# Patient Record
Sex: FEMALE | Race: WHITE | NOT HISPANIC OR LATINO | Employment: OTHER | URBAN - METROPOLITAN AREA
[De-identification: names, ages, dates, MRNs, and addresses within clinical notes are randomized per-mention and may not be internally consistent; named-entity substitution may affect disease eponyms.]

---

## 2017-07-06 ENCOUNTER — OFFICE VISIT (OUTPATIENT)
Dept: OBSTETRICS AND GYNECOLOGY | Facility: CLINIC | Age: 62
End: 2017-07-06

## 2017-07-06 VITALS
SYSTOLIC BLOOD PRESSURE: 122 MMHG | WEIGHT: 211.4 LBS | BODY MASS INDEX: 35.22 KG/M2 | DIASTOLIC BLOOD PRESSURE: 70 MMHG | HEIGHT: 65 IN

## 2017-07-06 DIAGNOSIS — Z01.419 GYNECOLOGIC EXAM NORMAL: Primary | ICD-10-CM

## 2017-07-06 PROCEDURE — 99396 PREV VISIT EST AGE 40-64: CPT | Performed by: NURSE PRACTITIONER

## 2017-07-06 RX ORDER — DULOXETIN HYDROCHLORIDE 30 MG/1
CAPSULE, DELAYED RELEASE ORAL
Refills: 5 | COMMUNITY
Start: 2017-06-29

## 2017-07-06 RX ORDER — SITAGLIPTIN 100 MG/1
TABLET, FILM COATED ORAL
Refills: 5 | COMMUNITY
Start: 2017-06-29 | End: 2022-12-15 | Stop reason: SDUPTHER

## 2017-07-06 RX ORDER — BENAZEPRIL/HYDROCHLOROTHIAZIDE 20 MG-25MG
TABLET ORAL
Refills: 1 | COMMUNITY
Start: 2017-06-29

## 2017-07-06 RX ORDER — LIRAGLUTIDE 6 MG/ML
INJECTION SUBCUTANEOUS
Refills: 5 | COMMUNITY
Start: 2017-06-01 | End: 2021-07-07

## 2017-07-06 RX ORDER — CLONIDINE HYDROCHLORIDE 0.2 MG/1
TABLET ORAL
Refills: 5 | COMMUNITY
Start: 2017-06-01

## 2017-07-06 RX ORDER — METFORMIN HYDROCHLORIDE 500 MG/1
TABLET, EXTENDED RELEASE ORAL
Refills: 5 | COMMUNITY
Start: 2017-06-29 | End: 2022-12-15 | Stop reason: SDUPTHER

## 2017-07-06 RX ORDER — METOCLOPRAMIDE 10 MG/1
10 TABLET ORAL
COMMUNITY

## 2017-07-06 NOTE — PATIENT INSTRUCTIONS
Pt. Counseled today on safe sex practices, pap smear performed, self breast examinations discussed, if positive family history mammogram starting at age 35 otherwise starting at age 40. Colonoscopy recommended.  Hormone replacement therapy discussed. Aspirin prophylaxis to reduce risk of stroke.  Calcium and Vitamin D requirements discussed.  Diet and exercise also counseled.

## 2017-07-06 NOTE — PROGRESS NOTES
Karen Fragoso is a 62 y.o. female.   Chief Complaint   Patient presents with   • Gynecologic Exam     Patient is here for annual.      HPI:pt here for annual exam, voices no gyn c/o    The following portions of the patient's history were reviewed and updated as appropriate: allergies, current medications, past family history, past medical history, past social history, past surgical history and problem list.    Review of Systems  Review of Systems   Constitutional: Negative.  Negative for unexpected weight change.   Respiratory: Negative for chest tightness and shortness of breath.    Cardiovascular: Negative for chest pain and palpitations.   Gastrointestinal: Negative for abdominal pain and blood in stool.   Endocrine: Negative.    Genitourinary: Negative for dyspareunia, dysuria, frequency, hematuria, menstrual problem, pelvic pain, vaginal bleeding, vaginal discharge and vaginal pain.   Musculoskeletal: Negative for joint swelling.   Skin: Negative for color change, rash and wound.   Allergic/Immunologic: Negative.    Psychiatric/Behavioral: Negative.    All other systems reviewed and are negative.      Objective   Physical Exam   Constitutional: She is oriented to person, place, and time. She appears well-developed and well-nourished.   HENT:   Head: Normocephalic.   Neck: Normal range of motion.   Cardiovascular: Normal rate and regular rhythm.    Pulmonary/Chest: Effort normal and breath sounds normal. Right breast exhibits no mass and no nipple discharge. Left breast exhibits no mass and no nipple discharge. There is no breast swelling.   Breasts soft without palpable masses   Abdominal: Soft. Bowel sounds are normal.   Genitourinary: Vagina normal and uterus normal. There is no rash or lesion on the right labia. There is no rash or lesion on the left labia. Cervix exhibits no friability. Right adnexum displays no mass, no tenderness and no fullness. Left adnexum displays no mass, no tenderness and no  fullness.   Genitourinary Comments: Ovaries  Within normal limits. Cervix  Within normal limits   Neurological: She is alert and oriented to person, place, and time.   Skin: Skin is warm and dry.   Psychiatric: She has a normal mood and affect. Her behavior is normal.   Vitals reviewed.      Assessment/Plan   Patient Instructions   Pt. Counseled today on safe sex practices, pap smear performed, self breast examinations discussed, if positive family history mammogram starting at age 35 otherwise starting at age 40. Colonoscopy recommended.  Hormone replacement therapy discussed. Aspirin prophylaxis to reduce risk of stroke.  Calcium and Vitamin D requirements discussed.  Diet and exercise also counseled.         Karen was seen today for gynecologic exam.    Diagnoses and all orders for this visit:    Gynecologic exam normal  -     Pap IG, Rfx HPV ASCU        Return in about 1 year (around 7/6/2018).

## 2017-07-10 LAB
CONV .: NORMAL
CYTOLOGIST CVX/VAG CYTO: NORMAL
CYTOLOGY CVX/VAG DOC THIN PREP: NORMAL
DX ICD CODE: NORMAL
HIV 1 & 2 AB SER-IMP: NORMAL
OTHER STN SPEC: NORMAL
PATH REPORT.FINAL DX SPEC: NORMAL
STAT OF ADQ CVX/VAG CYTO-IMP: NORMAL

## 2017-08-29 ENCOUNTER — TELEPHONE (OUTPATIENT)
Dept: OBSTETRICS AND GYNECOLOGY | Facility: CLINIC | Age: 62
End: 2017-08-29

## 2018-06-04 ENCOUNTER — TRANSCRIBE ORDERS (OUTPATIENT)
Dept: ADMINISTRATIVE | Facility: HOSPITAL | Age: 63
End: 2018-06-04

## 2018-06-04 DIAGNOSIS — Z12.31 VISIT FOR SCREENING MAMMOGRAM: Primary | ICD-10-CM

## 2018-08-30 ENCOUNTER — CONVERSION ENCOUNTER (OUTPATIENT)
Dept: GENERAL RADIOLOGY | Facility: HOSPITAL | Age: 63
End: 2018-08-30

## 2019-10-15 ENCOUNTER — HOSPITAL ENCOUNTER (OUTPATIENT)
Dept: GENERAL RADIOLOGY | Facility: HOSPITAL | Age: 64
Discharge: HOME | End: 2019-10-15
Attending: NURSE PRACTITIONER

## 2020-02-27 ENCOUNTER — HOSPITAL ENCOUNTER (OUTPATIENT)
Dept: DIABETES SERVICES | Facility: HOSPITAL | Age: 65
Discharge: HOME | End: 2020-02-27
Attending: NURSE PRACTITIONER

## 2020-02-27 ENCOUNTER — OFFICE VISIT CONVERTED (OUTPATIENT)
Dept: DIABETES SERVICES | Facility: HOSPITAL | Age: 65
End: 2020-02-27
Attending: NURSE PRACTITIONER

## 2020-07-22 ENCOUNTER — OFFICE VISIT CONVERTED (OUTPATIENT)
Dept: DIABETES SERVICES | Facility: HOSPITAL | Age: 65
End: 2020-07-22
Attending: NURSE PRACTITIONER

## 2020-07-22 ENCOUNTER — HOSPITAL ENCOUNTER (OUTPATIENT)
Dept: DIABETES SERVICES | Facility: HOSPITAL | Age: 65
Discharge: HOME | End: 2020-07-22
Attending: NURSE PRACTITIONER

## 2020-09-23 ENCOUNTER — OFFICE VISIT CONVERTED (OUTPATIENT)
Dept: DIABETES SERVICES | Facility: HOSPITAL | Age: 65
End: 2020-09-23
Attending: NURSE PRACTITIONER

## 2020-09-23 ENCOUNTER — HOSPITAL ENCOUNTER (OUTPATIENT)
Dept: DIABETES SERVICES | Facility: HOSPITAL | Age: 65
Discharge: HOME | End: 2020-09-23
Attending: NURSE PRACTITIONER

## 2020-09-23 LAB — GLUCOSE BLD-MCNC: 125 MG/DL (ref 65–99)

## 2021-01-26 ENCOUNTER — HOSPITAL ENCOUNTER (OUTPATIENT)
Dept: DIABETES SERVICES | Facility: HOSPITAL | Age: 66
Discharge: HOME | End: 2021-01-26
Attending: NURSE PRACTITIONER

## 2021-01-26 ENCOUNTER — OFFICE VISIT CONVERTED (OUTPATIENT)
Dept: DIABETES SERVICES | Facility: HOSPITAL | Age: 66
End: 2021-01-26
Attending: NURSE PRACTITIONER

## 2021-01-26 LAB — GLUCOSE BLD-MCNC: 171 MG/DL (ref 65–99)

## 2021-03-09 ENCOUNTER — HOSPITAL ENCOUNTER (OUTPATIENT)
Dept: GENERAL RADIOLOGY | Facility: HOSPITAL | Age: 66
Discharge: HOME | End: 2021-03-09
Attending: NURSE PRACTITIONER

## 2021-04-27 ENCOUNTER — HOSPITAL ENCOUNTER (OUTPATIENT)
Dept: DIABETES SERVICES | Facility: HOSPITAL | Age: 66
Discharge: HOME | End: 2021-04-27
Attending: NURSE PRACTITIONER

## 2021-04-27 ENCOUNTER — OFFICE VISIT CONVERTED (OUTPATIENT)
Dept: DIABETES SERVICES | Facility: HOSPITAL | Age: 66
End: 2021-04-27
Attending: NURSE PRACTITIONER

## 2021-04-27 LAB — GLUCOSE BLD-MCNC: 123 MG/DL (ref 65–99)

## 2021-05-28 VITALS
SYSTOLIC BLOOD PRESSURE: 142 MMHG | DIASTOLIC BLOOD PRESSURE: 92 MMHG | WEIGHT: 219 LBS | RESPIRATION RATE: 18 BRPM | HEIGHT: 65 IN | BODY MASS INDEX: 36.49 KG/M2 | BODY MASS INDEX: 37.39 KG/M2 | WEIGHT: 224.43 LBS | HEIGHT: 65 IN

## 2021-05-28 VITALS
SYSTOLIC BLOOD PRESSURE: 138 MMHG | RESPIRATION RATE: 18 BRPM | BODY MASS INDEX: 36.62 KG/M2 | WEIGHT: 225.09 LBS | BODY MASS INDEX: 36.4 KG/M2 | HEIGHT: 65 IN | DIASTOLIC BLOOD PRESSURE: 74 MMHG | DIASTOLIC BLOOD PRESSURE: 86 MMHG | RESPIRATION RATE: 18 BRPM | OXYGEN SATURATION: 98 % | WEIGHT: 219.8 LBS | OXYGEN SATURATION: 98 % | BODY MASS INDEX: 37.5 KG/M2 | TEMPERATURE: 97.1 F | HEART RATE: 78 BPM | TEMPERATURE: 98.4 F | SYSTOLIC BLOOD PRESSURE: 124 MMHG | HEART RATE: 80 BPM | RESPIRATION RATE: 18 BRPM | SYSTOLIC BLOOD PRESSURE: 138 MMHG | DIASTOLIC BLOOD PRESSURE: 68 MMHG | WEIGHT: 218.48 LBS | HEIGHT: 65 IN | HEIGHT: 65 IN

## 2021-05-28 NOTE — PROGRESS NOTES
Patient: ANDRIA GALEANA     Acct: [id number]     Report: #[id number]  UNIT #: [id number]     : 1955    Encounter Date:2020  PRIMARY CARE: HALINA DOCKERY  ***Laura***  --------------------------------------------------------------------------------------------------------------------  Date of Encounter      2020            Chief Complaint      DIABETES MELLITUS TYPE II            Referring Providers/Copies To      Primary Care Provider:  Halina Dockery            Allergies      Coded Allergies:             STATINS-HMG-COA REDUCTASE INHIBITOR (Verified  Adverse Reaction, Unknown,     20)            Medications      Last Reconciled on 20 3:20 pm by JENNIFER MURDOCK      Dulaglutide (Trulicity) 1.5 Mg/0.5 Ml Pen.injctr      1.5 MG SUBQ Q7DAY for 28 Days, #4 PEN.INJCTR 5 Refills         Prov: JENNIFER MURDOCK         20       Insulin Glargine,Hum.rec.anlog (Toujeo Solostar) 300 Unit/1 Ml Insuln.pen      40 UNITS SUBQ QDAY, #1 INSULN.PEN         Reported         20       DULoxetine (Cymbalta) 30 Mg Capsule.dr      30 MG PO QDAY, #30 CAP 0 Refills         Reported         20       Dapagliflozin Propanediol (Farxiga) 10 Mg Tablet      10 MG PO QDAY, #30 TAB 0 Refills         Reported         20       Gabapentin (Gabapentin) 600 Mg Tablet      600 MG PO TID, #90 TAB 0 Refills         Reported         20       Metoclopramide HCl (Metoclopramide HCl) 5 Mg Tab.rapdis      10 MG PO HS PRN for NAUSEA, TAB         Reported         20       Cetirizine Hcl (CETIRIZINE HCL) 10 Mg Tablet      10 MG PO QDAY, #30 TAB 0 Refills         Reported         20       metFORMIN ER (Glucophage XR) 500 Mg Tab.er.24h      1000 MG PO BID, #120 TAB.ER 0 Refills         Reported         20       SITagliptin (Januvia) 100 Mg Tablet      100 MG PO QDAY, #30 TAB 0 Refills         Reported         20       Benazepril/HCTZ 20/25 MG (Benazepril/HCTZ 20/25 Mg) 1 Tab Tab      1 TAB PO QDAY,  #30 TAB         Reported         4/7/20       cloNIDine HCL (cloNIDine HCL) 0.2 Mg Tablet      0.2 MG PO BID, #60 TAB 0 Refills         Reported         4/7/20            Vital Signs      Height 5 ft 5 in / 165.1 cm      Weight 224 lbs 6.852 oz / 101.8 kg      BSA 2.08 m2      BMI 37.3 kg/m2      Respirations 18      Blood Pressure 142/92 Sitting, Left Arm            Eye Exam      When was your last eye exam?:        Last Fall      Where was it done?:        [facility]            Pain Score      Experiencing any pain?:  No            Preventative      Hx Influenza Vaccination:  Yes      Date Influenza Vaccine Given:  Oct 1, 2019      Influenza Vaccine Declined:  No      Have You Had 2 or More Falls i:  No      Have You Had a Fall with an In:  No      Hx Pneumococcal Vaccination:  No      Encouraged to follow-up with:  PCP regarding preventative exams.      Chart initiated by:      Phyllis Green MA            HPI - Diabetes      This patient is seen in the office today for follow-up evaluation for diabetes     medication management.  She is a 65-year-old female patient with a history of     type 2 diabetes complicated by diabetic neuropathy.  The patient is currently     managed using Farxiga 10 mg every day, Trulicity 0.75 mg once weekly, metformin     1000 mg twice a day, Januvia 100 mg every day, and Toujeo insulin.  At her last     appointment the patient reported that she was only taking her Toujeo insulin if     her blood sugar was above 200 mg/dL.  She was advised she needed to take her     Toujeo every day starting with a dose of 15 units every evening and then was     instructed to increase by 3 units every 4 days until she achieves a fasting     glucose of around 130 mg/dL or reached a total daily dose of 40 units.  The     patient reports that she is taking 40 units every day at this time.  She reports    glucose levels have improved but she continues to have hyperglycemia and     fluctuating glucose levels.   "          Most Recent Lab Findings      Most Recent HGA1C      There were no recent labs available for this patient      Diabetes Type:  Type 2      Diabetes Complications:  Neuropathy      Hospitalizations 2nd to DM?:  No      ER/911 Secondary to DM:  No      Dietary Habits:  2 meals daily (Primarily breakfast and supper), Snacks     (Occasional), Diet Drinks (Mostly water)      Exercise:  None (Due to knee issues)      BG Monitoring:  Meter      Frequency:  Times per day (1-2)      Blood Glucose Range:        Review of a logbook with her daily glucose checks for period of 20 days      show a range between 105 mg/dL and 206 mg/dL            PAST,FAMILY,   Past Medical History      Past Medical History:  Arthritis (Hands), GERD, Hyperlipidemia, Hypertension,     Liver Disease (NAFLD), Sleep Apnea (with CPAP)            Past Surgical History      Past Surgical History:  Bladder Surgery (sling), Joint Surgery      Other Past Surgical History      left knee total replacement            Past Family History      None            Social History      Marrital Status:        Lives independently:  Yes      Number of Children:  3      Occupation:  Private business owner            Tobacco Use      Smoking status:  Never smoker            Vaping      Currently Vaping:  No            Alcohol Use      None            Substance Use      Substance Use:  Denies Use            Review of Sytems      General:  No Appetite Changes; Fatigue (hot weather \"zaps\" energy); No Weight     Gain      Eyes:  Negative for Blurred Vision; Positive for Corrective Lenses; Negative for    Vision Changes      Cardiovascular:  No Chest Pain, No Palpitations      Gastrointestinal:  No Constipation, No Diarrhea, No Nausea/Vomiting      Integumentary:  No Lesions; Rash; No Wounds      Neurologic:  Extremity Pain (Feet worse in the evening when off of feet),     Numbness (Feet), Tingling (Feet)      Psychiatric:  No Anxiety, No Depression    "   Endocrine:  No Hypoglycemia, No Hypo Unawareness, No Nocturnal Hypo; Polyuria;     No Polyphagia; Polydipsia            Exam      General:  Awake and Alert, Appropriately dressed/groomed, No Acute Distress,       Eyes:  Sclera Non-Icteric, EOM Intact, Eyelids without swelling,       Cardiovascular:  No Peripheral Edema, Normal Chest Appearance, Hear Tones Normal    S1 S2,       Musculoskeletal:  Steady Gait, Normal Strength, Normal ROM,       Respiratory:  No Respiratory Distress, No Cyanosis, No use of Accessory Musc,       Skin:  No Blisters, No Cuts\Scrapes, No Acanthosis Nigricans, No DM Dermopathy,     No Fungus, No NLD, No Rash, No Vitiligo      Psychiatric:  Appropriate Affect, Intact Judgement, Oriented x 3,             Impression      Type 2 diabetes uncontrolled with diabetic neuropathy, arthritis, GERD, h    ypertension, hyperlipidemia, nonalcoholic fatty liver disease, obstructive sleep    apnea            Diagnosis      TYPE 2 DIABETES MELLITUS WITH HYPERGLYCEMIA - E11.65            Notes      Changed Medications      * INSULIN GLARGINE,HUM.REC.ANLOG (Toujeo Solostar) 300 UNIT/1 ML INSULN.PEN: 40       UNITS SUBQ QDAY #1         Instructions: Dispense 1 box of 100 pen needles.      * DULAGLUTIDE (Trulicity) 1.5 MG/0.5 ML PEN.INJCTR: 1.5 MG SUBQ Q7DAY 28 Days #4         Replaced 0.75 MG/0.5 ML PEN.INJCTR: 0.75 MG SUBQ Q7DAY #1         Dx: TYPE 2 DIABETES MELLITUS WITH HYPERGLYCEMIA - E11.65      New Diagnostics      * Hemoglobin A1c, Routine         Dx: TYPE 2 DIABETES MELLITUS WITH HYPERGLYCEMIA - E11.65      * Micro Alb UA H, Routine         Dx: TYPE 2 DIABETES MELLITUS WITH HYPERGLYCEMIA - E11.65            Plan      To gain better glucose control we will have the patient increase her Trulicity     to the maximum dose of 1.5 mg once weekly.  A prescription will be sent into her    pharmacy.  Additionally refills for her Toujeo will be sent in.  She will     continue on the 40 unit/day dose as well  as continue all the other medications     as previously prescribed.  The patient is encouraged to increase her testing to     2-3 times each day and record them for review at a follow-up appointment which     will be scheduled in approximately 2 months.  We will also collect an A1c and     urine microalbumin prior to her follow-up appointment.            Pain Plan      Pain Zero Today            Topics Patient Counseled on      Meds, Monitoring, Diet            Electronically signed by JENNIFER MURDOCK  08/02/2020 21:09       Disclaimer: Converted document may not contain table formatting or lab diagrams. Please see [employer] [facility] for the authenticated document.

## 2021-05-28 NOTE — PROGRESS NOTES
Patient: ANDRIA GALEANA     Acct: [id number]     Report: #[id number]  UNIT #: [id number]     : 1955    Encounter Date:2021  PRIMARY CARE: HALINA DOCKERY  ***ISignkelsea***  --------------------------------------------------------------------------------------------------------------------  Date of Encounter      2021            Chief Complaint      DIABETES MELLITUS TYPE II            Referring Providers/Copies To      Primary Care Provider:  Halina Dockery            Allergies      Coded Allergies:             STATINS-HMG-COA REDUCTASE INHIBITOR (Verified  Adverse Reaction, Unknown,     20)            Medications      Last Reconciled on 21 2:18 pm by JENNIFER MURDOCK      Omega-3 Fatty Acids/Fish Oil (Fish Oil 1000 Mg) 1 Each Capsule      1600 MG PO QDAY, #60 CAP 0 Refills         Reported         21       Esomeprazole Mag (nexIUM) 20 Mg Capsule.      20 MG PO QDAY@07, #30 CAP 0 Refills         Reported         21       Aspirin Chew (Aspirin Baby) 81 Mg Tab.chew      81 MG PO QDAY, #30 TAB.CHEW 0 Refills         Reported         21       Biotin (Biotin) 2,500 Mcg Cap      5000 MCG PO QDAY, #30 CAP         Reported         21       Dulaglutide (Trulicity) 1.5 Mg/0.5 Ml Pen.injctr      1.5 MG SUBQ Q7DAY for 28 Days, #4 PEN.INJCTR 5 Refills         Prov: JENNIFER MURDOCK         21       Insulin Glargine,Hum.rec.anlog (Toujeo Solostar) 300 Unit/1 Ml Insuln.pen      40 UNITS SUBQ QDAY, #1 INSULN.PEN         Reported         20       DULoxetine (Cymbalta) 30 Mg Capsule.      30 MG PO QDAY, #30 CAP 0 Refills         Reported         20       Dapagliflozin Propanediol (Farxiga) 10 Mg Tablet      10 MG PO QDAY, #30 TAB 0 Refills         Reported         20       Gabapentin (Gabapentin) 600 Mg Tablet      600 MG PO TID, #90 TAB 0 Refills         Reported         20       Metoclopramide HCl (Metoclopramide HCl) 5 Mg Tab.rapdis      10 MG PO HS PRN for  NAUSEA, TAB         Reported         4/7/20       Cetirizine Hcl (CETIRIZINE HCL) 10 Mg Tablet      10 MG PO QDAY, #30 TAB 0 Refills         Reported         4/7/20       metFORMIN ER (Glucophage XR) 500 Mg Tab.er.24h      1000 MG PO BID, #120 TAB.ER 0 Refills         Reported         4/7/20       SITagliptin (Januvia) 100 Mg Tablet      100 MG PO QDAY, #30 TAB 0 Refills         Reported         4/7/20       Benazepril/HCTZ 20/25 MG (Benazepril/HCTZ 20/25 Mg) 1 Tab Tab      1 TAB PO QDAY, #30 TAB         Reported         4/7/20       cloNIDine HCL (cloNIDine HCL) 0.2 Mg Tablet      0.2 MG PO BID, #60 TAB 0 Refills         Reported         4/7/20            Vital Signs      Height 5 ft 5.00 in / 165.1 cm      Weight 219 lbs 12.778 oz / 99.7 kg      BSA 2.06 m2      BMI 36.6 kg/m2      Temperature 97.1 F / 36.17 C - Temporal      Pulse 78      Respirations 18      Blood Pressure 124/68 Sitting, Left Arm      Pulse Oximetry 98%, room air      Blood Glucose Value:  123 (Finger Stick)            Eye Exam      When was your last eye exam?:        2019      Where was it done?:        [facility]            Pain Score      Experiencing any pain?:  Yes      Pain Scale Used:  Numerical      Pain Intensity:  8      Pain Comment:        Back            Preventative      Hx Influenza Vaccination:  Yes      Date Influenza Vaccine Given:  Oct 1, 2020      Influenza Vaccine Declined:  No      Have You Had 2 or More Falls i:  No      Have You Had a Fall with an In:  No      Hx Pneumococcal Vaccination:  No      Encouraged to follow-up with:  PCP regarding preventative exams.      Chart initiated by:      Phyllis Green MA            HPI - Diabetes      This patient is seen in the office today for follow-up evaluation for diabetes     medication management.  She is a 66-year-old female patient with a history of     type 2 diabetes controlled with diabetic neuropathy.  She is currently managed     using Trulicity 1.5  mg once weekly, Farxiga 10 mg once a day, Metformin 1000 mg     twice a day, Januvia 100 mg once a day, and Toujeo 40 units once a day.  She     denies any complications with these medications.            She denies any changes in her overall health status other than continued issues     with back pain due to her scoliosis and bone spurs.  She is scheduled to see     neurosurgeon on June 1 of this year.            Most Recent Lab Findings      Most Recent HGA1C      Her most recent A1c was collected on 4/14/2021 and was 6.4%.  A copy of this has    been scanned into the record.  This is some improvement in the A1c as compared     to the prior result of 6.8% collected in October 2020.      Diabetes Type:  Type 2      Diabetes Complications:  Neuropathy (Of the lower extremities)      Hospitalizations 2nd to DM?:  No      ER/911 Secondary to DM:  No      Dietary Habits:  3 Meals daily, Diet Drinks      Exercise:  None      BG Monitoring:  Meter      Frequency:  Times per day (2 times a day; sometimes up to 4 times a day)      Blood Glucose Range:        Glucose levels in the 140-150; occasional day with higher numbers            PAST,FAMILY,   Past Medical History      Past Medical History:  Arthritis, GERD, Hyperlipidemia, Hypertension, Liver     Disease (Nonalcoholic fatty liver disease), Sleep Apnea (With use of CPAP)      Other Medical History      Scoliosis of the spine with bone spurs            Past Surgical History      Past Surgical History:  Bladder Surgery, Joint Surgery            Past Family History      None            Social History      Lives independently:  Yes      Occupation:  Private business owner            Tobacco Use      Smoking status:  Never smoker            Alcohol Use      None            Substance Use      Substance Use:  Denies Use            Review of Sytems      General:  No Appetite Changes; Fatigue; No Weight Loss, No Weight Gain, No     Weakness      Eyes:  No Blurred Vision;  Corrective Lenses; No Vision Changes, No Vision Loss      Cardiovascular:  No Chest Pain, No Palpitations; Peripheral edema      Gastrointestinal:  No Constipation, No Diarrhea, No Nausea/Vomiting      Integumentary:  No Lesions, No Rash; Wounds      Neurologic:  Extremity Pain, Numbness, Tingling; No Headache, No Dizziness      Psychiatric:  No Anxiety, No Depression, No Stress      Endocrine:  No Hypoglycemia, No Hypo Unawareness, No Nocturnal Hypo, No     Polyuria, No Polyphagia, No Polydipsia      ENT:  No Hearing loss, No Sinusitis, No Difficulty swallowing      Respiratory:  No Shortness of breath, No Wheezing, No Cough      Genitourinary:  No UTI, No Vaginal yeast infections, No Difficulty urinating, No    Incontinence      Musculoskeletal:  Joint pain; No Muscle pain; Back pain            Exam      Constitutional:  Awake and Alert, Appropriately dressed/groomed; Not Acutely     Distressed      Eyes:  No Scleral Icterus; Intact EOM; No Eyelid swelling      Cardiovascular:  No Peripheral Edema; Normal Chest Appearance      Musculoskeletal:  Steady Gait, Normal Strength, Normal ROM,       Respiratory:  No Respiratory Distress, No Cyanosis, No Accessory Muscle use      Skin:  No Blisters, No Cuts\Scrapes, No Acanthosis Nigricans, No DM Dermopathy,     No Fungus, No NLD, No Rash, No Vitiligo      Psychiatric:  Appropriate Affect, Intact Judgement, Oriented x 3,       ENMT:  Nose/ears normal, Normal hearing      Extremities:  No Cyanosis, No Edema; Normal temperature; No Deformity            Impression      Type 2 diabetes controlled with diabetic neuropathy, arthritis, scoliosis, GERD,    hyperlipidemia, hypertension, nonalcoholic fatty liver disease, sleep apnea, obe    sity class II            Diagnosis            Type 2 diabetes mellitus with diabetic neuropathy, unspecified - E11.40            Notes            Plan      No changes were made to the patient's current treatment plan his glucose levels     are  well controlled.  He is scheduled for a follow-up appointment in 3 months.      We will reevaluate the A1c at the time of that visit.  She is to monitor her     glucose levels 2-3 times each day.  Should the patient have to undergo any     steroid injections for treatment of her back pain she is to call our office so     we can manage her glucose levels during that treatment.  Also, if she is     scheduled for surgery she will contact her office to advise.            Pain Plan      Follow-up with PCP/Pain Management            Electronically signed by JENNIFER MURDOCK  04/27/2021 14:18       Disclaimer: Converted document may not contain table formatting or lab diagrams. Please see [employer] [facility] for the authenticated document.

## 2021-05-28 NOTE — PROGRESS NOTES
Patient: ANDRIA GALEANA     Acct: [id number]     Report: #[id number]  UNIT #: [id number]     : 1955    Encounter Date:2020  PRIMARY CARE: HALINA DOCKERY  ***Signed***  --------------------------------------------------------------------------------------------------------------------  Date of Encounter      2020            Chief Complaint      DIABETES MELLITUS TYPE II            Referring Providers/Copies To      Primary Care Provider:  [facility]            Allergies      Coded Allergies:             Statins-Hmg-Coa Reductase Inhibitor (Verified  Adverse Reaction, Unknown,     20)            Medications      Last Reconciled on 20 3:20 pm by JENNIFER MURDOCK      Insulin Glargine,Hum.rec.anlog (Toujeo Solostar) 300 Unit/1 Ml Insuln.pen      15 UNITS SUBQ QDAY, #1 INSULN.PEN         Reported         20       DULoxetine (Cymbalta) 30 Mg Capsule.dr      30 MG PO QDAY, #30 CAP 0 Refills         Reported         20       Dapagliflozin Propanediol (Farxiga) 10 Mg Tablet      10 MG PO QDAY, #30 TAB 0 Refills         Reported         20       Gabapentin (Gabapentin) 600 Mg Tablet      600 MG PO TID, #90 TAB 0 Refills         Reported         20       Metoclopramide HCl (Metoclopramide HCl) 5 Mg Tab.rapdis      10 MG PO HS PRN for NAUSEA, TAB         Reported         20       Cetirizine Hcl (CETIRIZINE HCL) 10 Mg Tablet      10 MG PO QDAY, #30 TAB 0 Refills         Reported         20       metFORMIN ER (Glucophage XR) 500 Mg Tab.er.24h      1000 MG PO BID, #120 TAB.ER 0 Refills         Reported         20       SITagliptin (Januvia) 100 Mg Tablet      100 MG PO QDAY, #30 TAB 0 Refills         Reported         20       Benazepril/HCTZ 20/25 MG (Benazepril/HCTZ 20/25 Mg) 1 Tab Tab      1 TAB PO QDAY, #30 TAB         Reported         20       cloNIDine HCL (cloNIDine HCL) 0.2 Mg Tablet      0.2 MG PO BID, #60 TAB 0 Refills         Reported         20        Dulaglutide (Trulicity) 0.75 Mg/0.5 Ml Pen.injctr      0.75 MG SUBQ Q7DAY, #1 PEN.INJCTR         Reported         4/7/20            Vital Signs      Height 5 ft 5 in / 165.1 cm      Weight 219 lbs  / 99.635733 kg      BSA 2.06 m2      BMI 36.4 kg/m2      Blood Glucose Value:  177            Preventative      Hx Influenza Vaccination:  Yes      Date Influenza Vaccine Given:  Oct 1, 2019      Influenza Vaccine Declined:  No      Have You Had 2 or More Falls i:  No      Have You Had a Fall with an In:  No      Hx Pneumococcal Vaccination:  No      Encouraged to follow-up with:  PCP regarding preventative exams.            HPI - Diabetes      This patient presents to the office today for evaluation for type 2 diabetes.      He is referred to our office by her primary care provider Halina Dockery.  The     patient reports a 12-year history of type 2 diabetes uncontrolled.  Her diabetes    is complicated by diabetic neuropathy.  The patient states her blood sugars have    been running high but have been little bit better since some medications have     been adjusted recently.  The patient is taking Trulicity 0.75 mg once weekly     which she states was started approximately 5 weeks ago.  Prior to that the     patient was using Victoza.  Additionally, the patient is taking Januvia 100 mg     every day, Farxiga 10 mg every day, and Toujeo 15 units however the patient     states that she is only taking it if her blood sugars are over 200.  The patient    denies any hospitalizations or emergency department visits due to her diabetes     specifically.  She denies ever having received any formal diabetes education.            Most Recent Lab Findings      Most Recent HGA1C      Most recent A1c available from the primary care provider's office was collected     on 8/15/2019 and it was 8.1%.  We will request a new A1c prior to the patient's     upcoming visit.      A urine microalbumin collected at the primary care provider's  office on     8/15/2019 was within normal limits.      Diabetes Type:  Type 2      Diabetes Complications:  Neuropathy (Numbness of the feet)      Hospitalizations 2nd to DM?:  No      ER/911 Secondary to DM:  No      Dietary Habits:  2 meals daily (Primarily breakfast and supper), Snacks     (Occasional), Diet Drinks (Mostly water)      Exercise:  None (Due to knee issues)      BG Monitoring:  Meter      Frequency:  Times per day (1-2)      Blood Glucose Range:        Review of a logbook with her daily glucose checks for period of 20 days      show a range between 144 mg/dL and 253 mg/dL            PAST,FAMILY,   Past Medical History      Arthritis (Hands), GERD, High Cholesterol, Hypertension, Sleep Apnea (With use     of CPAP)      Other Medical History      Frequent vaginal yeast infections            Past Surgical History      Bladder Surgery (Bladder sling), Joint Surgery (Left total knee replacement)            Past Family History      None            Social History      Marrital Status:        Lives independently:  Yes      Number of Children:  3      Occupation:  Private business owner            Tobacco Use      Smoking status:  Never smoker            Vaping      Currently Vaping:  No            Alcohol Use      None            Substance Use      Substance Use:  Denies Use            General:  No Appetite Change, No Fatigue, No Weight Gain, No Weight Loss      Eyes:  Negative for Blurred Vision, Negative for Corrective Lenses, Negative for    Vision Changes      Cardiovascular:  No Chest Pain, No Palpitations      Gastrointestinal:  No Constipation, No Diarrhea, No Nausea/Vomiting      Integumentary:  Wounds (On bottom of left foot due to attempts to remove a     callus; she is under the care of a podiatrist for treatment)      Neurologic:  Numbness (Of the feet)      Psychiatric:  No Anxiety, No Depression      Endocrine:  No Hypoglycemia, No Hypo Unawareness, No Nocturnal Hypo, No     Polyuria,  No Polyphasia, No Polydipsia      Other      THE ABOVE IS THE REVIEW OF SYSTEMS            General:  No Appetite Change, No Fatigue, No Weight Gain, No Weight Loss      Eyes:  Sclera Non-Icteric, EOM Intact, Eyelids without swelling      Cardiovascular:  No Peripheral Edema, Normal Chest Appearance, Hear Tones Normal    S1 S2      Musculoskeletal:  Steady Gait, Normal Strength, Normal ROM      Respiratory:  No Respiratory Distress, No Cyanosis, No use of Accessory Musc      Skin:  No Blisters, No Cuts\Scrapes, No Acanthosis Nigricans, No DM Dermopathy,     No Fungus, No NLD, No Rash, No Vitiligo      Psychiatric:  AAO X 3, Appropriate Affect, Intact Judgement      Other      THE ABOVE IS THE PHYSICAL EXAM            Impression      Type 2 diabetes uncontrolled with neuropathy, hypertension, obstructive sleep     apnea, GERD, vaginal yeast infections, arthritis of the hands, hyperlipidemia            Diagnosis      TYPE 2 DIABETES MELLITUS WITH HYPERGLYCEMIA - E11.65            Notes            Plan      The patient states that she is taking her medications as prescribed with the     exception of the Toujeo insulin which she is only taking if her blood sugar is     above 200.  At this time it was explained to the patient that it is important     that she takes the Toujeo regularly.  She will take 15 units every evening and     then was instructed to increase the dose by 3 units every 4 days until her     fasting blood sugars are around 130 mg/dL or she reaches a total daily dose of     40 units.  She was asked to increase her testing to 3-4 times each day.  We will    schedule the patient for follow-up appointment in 6 weeks.  We will also obtain     an A1c prior to that visit.            Patient Education      Patient Education Provided:  Yes            Topics Patient Counseled on      Meds            Electronically signed by JENNIFER MURDOCK  05/16/2020 20:52       Disclaimer: Converted document may not contain table  formatting or lab diagrams. Please see [employer] for the authenticated document.

## 2021-05-28 NOTE — PROGRESS NOTES
Patient: ANDRIA GALEANA     Acct: [id number]     Report: #[id number]  UNIT #: [id number]     : 1955    Encounter Date:2020  PRIMARY CARE: ROBYN ULRICH  ***Laura***  --------------------------------------------------------------------------------------------------------------------  Date of Encounter      Sep 23, 2020            Allergies      Coded Allergies:             STATINS-HMG-COA REDUCTASE INHIBITOR (Verified  Adverse Reaction, Unknown,     20)            Medications      Last Reconciled on 20 1:58 pm by JENNIFER MURDOCK      Dulaglutide (Trulicity) 1.5 Mg/0.5 Ml Pen.injctr      1.5 MG SUBQ Q7DAY for 28 Days, #4 PEN.INJCTR 5 Refills         Prov: JENNIFER MURDOCK         20       Insulin Glargine,Hum.rec.anlog (Toujeo Solostar) 300 Unit/1 Ml Insuln.pen      40 UNITS SUBQ QDAY, #1 INSULN.PEN         Reported         20       DULoxetine (Cymbalta) 30 Mg Capsule.dr      30 MG PO QDAY, #30 CAP 0 Refills         Reported         20       Dapagliflozin Propanediol (Farxiga) 10 Mg Tablet      10 MG PO QDAY, #30 TAB 0 Refills         Reported         20       Gabapentin (Gabapentin) 600 Mg Tablet      600 MG PO TID, #90 TAB 0 Refills         Reported         20       Metoclopramide HCl (Metoclopramide HCl) 5 Mg Tab.rapdis      10 MG PO HS PRN for NAUSEA, TAB         Reported         20       Cetirizine Hcl (CETIRIZINE HCL) 10 Mg Tablet      10 MG PO QDAY, #30 TAB 0 Refills         Reported         20       metFORMIN ER (Glucophage XR) 500 Mg Tab.er.24h      1000 MG PO BID, #120 TAB.ER 0 Refills         Reported         20       SITagliptin (Januvia) 100 Mg Tablet      100 MG PO QDAY, #30 TAB 0 Refills         Reported         20       Benazepril/HCTZ 20/25 MG (Benazepril/HCTZ 20/25 Mg) 1 Tab Tab      1 TAB PO QDAY, #30 TAB         Reported         20       cloNIDine HCL (cloNIDine HCL) 0.2 Mg Tablet      0.2 MG PO BID, #60 TAB 0 Refills          Reported         4/7/20            Vital Signs      Height 5 ft 5 in / 165.1 cm      Weight 225 lbs 1.435 oz / 102.1 kg      BSA 2.08 m2      BMI 37.5 kg/m2      Respirations 18      Blood Pressure 138/74 Sitting, Left Arm      Blood Glucose Value:  125 (finger stick)            Eye Exam      When was your last eye exam?:        2019      Where was it done[facility]            Pain Score      Experiencing any pain?:  No            Preventative      Hx Influenza Vaccination:  Yes      Date Influenza Vaccine Given:  Oct 1, 2019      Influenza Vaccine Declined:  No      Have You Had 2 or More Falls i:  Yes      Have You Had a Fall with an In:  No      Hx Pneumococcal Vaccination:  No      Encouraged to follow-up with:  PCP regarding preventative exams.      Chart initiated by:      Phyllis Green MA            HPI - Diabetes      This patient is seen in the office today for follow-up evaluation for diabetes     medication management.  She is a 65-year-old female patient with a history of t    ype 2 diabetes controlled.  Her diabetes is complicated by diabetic neuropathy.     The patient is currently managed using Trulicity 5 mg once weekly, Farxiga 10 mg    once a day, Toujeo 40 units once a day, metformin 1000 mg twice a day, and     Januvia 100 mg once a day.  The Trulicity was increased from 0.75 mg to the     current dose of 1.5 mg at her last appointment.  The patient reports glucose     levels are typically running between 130 and 170 on most occasions.  She states     glucose levels are higher in the evening but these are sometimes postmeal     glucoses.  She denies any complications of the medication she is currently     taking.  She denies any changes in her overall health status since last being     seen.            Most Recent Lab Findings      Most Recent HGA1C      An A1c was collected on July 23, 2020 and was 6.9% indicating type 2 diabetes     controlled.      Diabetes Type:   Type 2      Diabetes Complications:  Neuropathy (Of the lower extremities)      Hospitalizations 2nd to DM?:  No      ER/911 Secondary to DM:  No      Dietary Habits:  3 Meals daily, Diet Drinks      Exercise:  None      BG Monitoring:  Meter      Frequency:  Times per day (2 times a day)      Blood Glucose Range:        Glucose levels range between 127 and 206 mg/dL.            PAST,FAMILY,   Past Medical History      Past Medical History:  Arthritis, GERD, Hyperlipidemia, Hypertension, Liver     Disease, Sleep Apnea            Past Surgical History      Past Surgical History:  Bladder Surgery, Joint Surgery            Past Family History      None            Social History      Lives independently:  Yes      Occupation:  Private business owner            Tobacco Use      Smoking status:  Never smoker            Alcohol Use      None            Substance Use      Substance Use:  Denies Use            Review of Sytems      General:  No Appetite Changes, No Fatigue, No Weight Loss, No Weight Gain      Eyes:  Negative for Blurred Vision, Negative for Corrective Lenses, Negative for    Vision Changes      Cardiovascular:  No Chest Pain, No Palpitations      Gastrointestinal:  No Constipation, No Diarrhea, No Nausea/Vomiting      Integumentary:  No Lesions, No Rash, No Wounds      Neurologic:  No Extremity Pain, No Numbness, No Tingling      Psychiatric:  No Anxiety, No Depression      Endocrine:  Hypoglycemia; No Hypo Unawareness, No Nocturnal Hypo; Polyuria; No     Polyphagia, No Polydipsia            Exam      General:  Awake and Alert, Appropriately dressed/groomed, No Acute Distress,       Eyes:  Sclera Non-Icteric, EOM Intact, Eyelids without swelling,       Cardiovascular:  No Peripheral Edema, Normal Chest Appearance, Hear Tones Normal    S1 S2,       Musculoskeletal:  Steady Gait, Normal Strength, Normal ROM,       Respiratory:  No Respiratory Distress, No Cyanosis, No use of Accessory Musc,       Skin:  No  Blisters, No Cuts\Scrapes, No Acanthosis Nigricans, No DM Dermopathy,     No Fungus, No NLD, No Rash, No Vitiligo      Psychiatric:  Appropriate Affect, Intact Judgement, Oriented x 3,             Impression      Type 2 diabetes uncontrolled with diabetic neuropathy, arthritis, GERD,     hypertension, hyperlipidemia, nonalcoholic fatty liver disease, obstructive     sleep apnea            Diagnosis      Type 2 diabetes mellitus with diabetic neuropathy, unspecified - E11.40            Plan      No changes were made to the patient's current medication management plan.  The     patient is encouraged to increase her testing to 2-3 times every day.  An A1c     and urine microalbumin was sent to the patient in August for collection but the     patient failed to do so.  A copy of these requisitions were printed and the     patient was asked to collect them in mid October.  She will be scheduled for     follow-up appointment in this office in 3 months.  She will contact her office     if she has any problematic glucose levels in the meantime.            Pain Plan      Pain Zero Today            Electronically signed by JENNIFER MURDOCK  09/23/2020 14:21       Disclaimer: Converted document may not contain table formatting or lab diagrams. Please see [email or web address] [facility] for the authenticated document.

## 2021-05-28 NOTE — PROGRESS NOTES
Patient: ANDRIA GALEANA     Acct: [id number]     Report: #[id number]  UNIT #: [id number]     : 1955    Encounter Date:2021  PRIMARY CARE: HALINA DOCKERY  ***ISignkelsea***  --------------------------------------------------------------------------------------------------------------------  Date of Encounter      2021            Chief Complaint      DIABETES MELLITUS TYPE II            Referring Providers/Copies To      Primary Care Provider:  Hal[facility] Dockery            Allergies      Coded Allergies:             STATINS-HMG-COA REDUCTASE INHIBITOR (Verified  Adverse Reaction, Unknown,     20)            Medications      Last Reconciled on 21 2:15 pm by JENNIFER MURDOCK      Dulaglutide (Trulicity) 1.5 Mg/0.5 Ml Pen.injctr      1.5 MG SUBQ Q7DAY for 28 Days, #4 PEN.INJCTR 5 Refills         Prov: JENNIFER MURDOCK         21       Insulin Glargine,Hum.rec.anlog (Toujeo Solostar) 300 Unit/1 Ml Insuln.pen      40 UNITS SUBQ QDAY, #1 INSULN.PEN         Reported         20       DULoxetine (Cymbalta) 30 Mg Capsule.dr      30 MG PO QDAY, #30 CAP 0 Refills         Reported         20       Dapagliflozin Propanediol (Farxiga) 10 Mg Tablet      10 MG PO QDAY, #30 TAB 0 Refills         Reported         20       Gabapentin (Gabapentin) 600 Mg Tablet      600 MG PO TID, #90 TAB 0 Refills         Reported         20       Metoclopramide HCl (Metoclopramide HCl) 5 Mg Tab.rapdis      10 MG PO HS PRN for NAUSEA, TAB         Reported         20       Cetirizine Hcl (CETIRIZINE HCL) 10 Mg Tablet      10 MG PO QDAY, #30 TAB 0 Refills         Reported         20       metFORMIN ER (Glucophage XR) 500 Mg Tab.er.24h      1000 MG PO BID, #120 TAB.ER 0 Refills         Reported         20       SITagliptin (Januvia) 100 Mg Tablet      100 MG PO QDAY, #30 TAB 0 Refills         Reported         20       Benazepril/HCTZ 20/25 MG (Benazepril/HCTZ 20/25 Mg) 1 Tab Tab      1 TAB PO QDAY,  #30 TAB         Reported         4/7/20       cloNIDine HCL (cloNIDine HCL) 0.2 Mg Tablet      0.2 MG PO BID, #60 TAB 0 Refills         Reported         4/7/20            Vital Signs      Height 5 ft 5 in / 165.1 cm      Weight 218 lbs 7.614 oz / 99.1 kg      BSA 2.05 m2      BMI 36.4 kg/m2      Temperature 98.4 F / 36.89 C - Temporal      Pulse 80      Respirations 18      Blood Pressure 138/86 Sitting, Left Arm      Pulse Oximetry 98%, room air      Blood Glucose Value:  171 (Finger Stick)            Eye Exam      When was your last eye exam?:        2019      Where was it done[facility]            Pain Score      Experiencing any pain?:  Yes      Pain Scale Used:  Numerical      Pain Intensity:  4      Pain Comment:        Back Pain            Preventative      Hx Influenza Vaccination:  Yes      Date Influenza Vaccine Given:  Oct 1, 2020      Influenza Vaccine Declined:  No      Have You Had 2 or More Falls i:  No      Have You Had a Fall with an In:  No      Hx Pneumococcal Vaccination:  No      Encouraged to follow-up with:  PCP regarding preventative exams.      Chart initiated by:      Phyllis Green MA            HPI - Diabetes      This patient is seen in the office today for follow-up evaluation for diabetes     medication management.  She is a 65-year-old female patient with a history of     type 2 diabetes controlled.  She is currently managed using Trulicity 1.5 mg     once weekly, Toujeo 40 units once a day, Farxiga 10 mg once a day, metformin     1000 mg twice a day, and Januvia 100 mg once a day.  She reports she has     recently had some higher glucose levels than usual because of a urinary tract     infection and yeast infection.  She states she was started on antibiotics and     steroids by her primary care provider but she did not tolerate the antibiotics.     She states that after 3 days of use she had excessive fatigue and her heart rate    dropped in the 40s.  She  stopped the medications and began to feel better     afterward.            She has recently been diagnosed with scoliosis of the spine and has multiple     bone spurs on the spine.  She is to undergo MRI for further evaluation.            Most Recent Lab Findings      Most Recent HGA1C      A point-of-care A1c was collected in the office today and was 6.4%.  This     indicates controlled type 2 diabetes.  In comparison to the prior result     collected in October 2020 which was 6.8% this indicates some improvement in the     A1c.      Diabetes Type:  Type 2      Diabetes Complications:  Neuropathy (Of the lower extremities)      Hospitalizations 2nd to DM?:  No      ER/911 Secondary to DM:  No      Dietary Habits:  3 Meals daily, Diet Drinks      Exercise:  None      BG Monitoring:  Meter      Frequency:  Times per day (2 times a day; sometimes up to 4 times a day)      Blood Glucose Range:        Glucose levels in the 140-150; occasional day with higher numbers            PAST,FAMILY,   Past Medical History      Past Medical History:  Arthritis, GERD, Hyperlipidemia, Hypertension, Liver     Disease (Nonalcoholic fatty liver disease), Sleep Apnea (With use of CPAP)      Other Medical History      Scoliosis of the spine with bone spurs            Past Surgical History      Past Surgical History:  Bladder Surgery, Joint Surgery            Past Family History      None            Social History      Lives independently:  Yes      Occupation:  Private business owner            Tobacco Use      Smoking status:  Never smoker            Alcohol Use      None            Substance Use      Substance Use:  Denies Use            Review of Sytems      General:  No Appetite Changes; Fatigue, Weight Loss (7 pound weight loss since     September); No Weight Gain; Weakness      Eyes:  No Blurred Vision; Corrective Lenses; No Vision Changes, No Vision Loss      Cardiovascular:  No Chest Pain, No Palpitations, No Peripheral edema       Gastrointestinal:  No Constipation, No Diarrhea, No Nausea/Vomiting      Integumentary:  No Lesions, No Rash, No Wounds      Neurologic:  Extremity Pain (Feet), Numbness (Fingers and Feet), Tingling     (Feet); No Headache, No Dizziness      Psychiatric:  No Anxiety, No Depression, No Stress      Endocrine:  No Hypoglycemia, No Hypo Unawareness, No Nocturnal Hypo; Polyuria;     No Polyphagia, No Polydipsia      ENT:  No Hearing loss, No Sinusitis, No Difficulty swallowing      Respiratory:  No Shortness of breath, No Wheezing, No Cough      Genitourinary:  UTI; No Vaginal yeast infections, No Difficulty urinating, No     Incontinence      Musculoskeletal:  No Joint pain, No Muscle pain; Back pain            Exam      Constitutional:  Awake and Alert, Appropriately dressed/groomed; Not Acutely     Distressed      Eyes:  No Scleral Icterus; Intact EOM; No Eyelid swelling      Cardiovascular:  No Peripheral Edema; Normal Chest Appearance      Musculoskeletal:  Steady Gait, Normal Strength, Normal ROM,       Respiratory:  No Respiratory Distress, No Cyanosis, No Accessory Muscle use      Skin:  No Blisters, No Cuts\Scrapes, No Acanthosis Nigricans, No DM Dermopathy,     No Fungus, No NLD, No Rash, No Vitiligo      Psychiatric:  Appropriate Affect, Intact Judgement, Oriented x 3,       ENMT:  Nose/ears normal, Normal hearing      Extremities:  No Cyanosis, No Edema; Normal temperature; No Deformity            Impression      Type 2 diabetes controlled with diabetic neuropathy, arthritis, GERD,     hyperlipidemia, hypertension, sleep apnea, nonalcoholic fatty liver disease,     scoliosis of the spine            Diagnosis      Type 2 diabetes mellitus with diabetic nephropathy - E11.21            Plan      No changes were made to the patient's treatment plan today given her well-    controlled glucose levels per her A1c.  She is to continue monitoring his     glucose levels 2-3 times each day or more.  She will be  scheduled for a follow-    up appointment in 3 months for reevaluation.  If she experiences any problematic    glucose levels she will contact our office.            Pain Plan      Follow-up with PCP/Pain Management            Electronically signed by JENNIFER MURDOCK  01/26/2021 14:15       Disclaimer: Converted document may not contain table formatting or lab diagrams. Please see [employer] [facility] for the authenticated document.

## 2021-06-01 ENCOUNTER — OFFICE VISIT CONVERTED (OUTPATIENT)
Dept: NEUROSURGERY | Facility: CLINIC | Age: 66
End: 2021-06-01
Attending: NEUROLOGICAL SURGERY

## 2021-06-05 NOTE — H&P
History and Physical      Patient Name: Karen Fragoso   Patient ID: [id number]   Sex: Female   YOB: 1955    Referring Provider: Halina BENDER    Visit Date: June 1, 2021    Provider: Franklyn Capps MD   Location: [facility]   Location Address: [address] [place] [address]  [id number]   Location Phone: [phone]          Chief Complaint  · Back pain      History Of Present Illness  The patient is a 66 year old female, who presents on referral from Halina BENDER, for a neurosurgical evaluation of low back pain.   The pain developed gradually several years ago and worsened a few months ago. It is severe (7-8/10) has an aching quality and does not radiate (was going to the left hip). The pain has been waxing and waning in severity. The pain tends to be maximal at no specific time, but waxes and wanes in severity throughout the day. The patient states the pain is aggravated by walking long distances and lifting. It is alleviated by sitting with an icepack.   She denies numbness in the legs. The patient has no prior history of neck or back surgery.   RECENT INTERVENTIONS:  She has been previously treated with NSAIDs, pain medication, and ice.   INFORMATION REVIEWED:  The following information was reviewed: radiology reports and images. MRI of the lumbar spine revealed she has multilevel DDD with mild degenerative scoliosis. She has multiple disc protrusions most notable at L2-3.       Past Medical History  Acute Nasopharyngitis (Common Cold); Acute sinusitis; Diabetes mellitus with hyperglycemia; ETD (Eustachian tube dysfunction), bilateral; Exposure to COVID-19 virus; History of diabetic ulcer of foot; Hyperlipidemia; Hypertension; Low back pain; Pain in left hip; Scoliosis, unspecified scoliosis type, unspecified spinal region; Skin infection; Sleep apnea; UTI (urinary tract infection)         Past Surgical History  Joint Surgery         Medication  List  aspirin 81 mg oral tablet,delayed release (DR/EC); benazepril-hydrochlorothiazide 20-25 mg oral tablet; biotin 5,000 mcg oral tablet,disintegrating; clonidine HCl 0.2 mg oral tablet; duloxetine 30 mg oral capsule,delayed release(DR/EC); Farxiga 10 mg oral tablet; Fish Oil Concentrate 1,000 mg oral capsule; gabapentin 600 mg oral tablet; Januvia 100 mg oral tablet; metformin 500 mg oral tablet; metoclopramide HCl 10 mg oral tablet; Nexium 20 mg oral capsule,delayed release(DR/EC); Toujeo Max U-300 SoloStar 300 unit/mL (3 mL) subcutaneous insulin pen; Trulicity 1.5 mg/0.5 mL subcutaneous pen injector         Allergy List  STATINS-HMG-COA REDUCTASE INHIBITORS       Allergies Reconciled  Family Medical History  Family history of cancer         Social History  Tobacco (Never)         Review of Systems  · Constitutional  o Admits  o : excessive sweating  o Denies  o : chills, fatigue, fever, sycope/passing out, weight gain, weight loss  · Eyes  o Denies  o : changes in vision, blurry vision, double vision  · HENT  o Denies  o : loss of hearing, ringing in the ears, ear aches, sore throat, nasal congestion, sinus pain, nose bleeds, seasonal allergies  · Cardiovascular  o Admits  o : swollen legs  o Denies  o : blood clots, anemia, easy burising or bleeding, transfusions  · Respiratory  o Denies  o : shortness of breath, dry cough, productive cough, pneumonia, COPD  · Gastrointestinal  o Admits  o : reflux  o Denies  o : difficulty swallowing  · Genitourinary  o Admits  o : incontinence  · Neurologic  o Admits  o : falls  o Denies  o : headache, seizure, stroke, tremor, loss of balance, dizziness/vertigo, difficulty with sleep, numbness/tingling/paresthesia , difficulty with coordination, difficulty with dexterity, weakness  · Musculoskeletal  o Admits  o : muscle aches, joint pain, low back pain  o Denies  o : neck stiffness/pain, swollen lymph nodes, weakness, spasms, sciatica, pain radiating in arm, pain radiating  "in leg  · Endocrine  o Admits  o : diabetes  o Denies  o : thyroid disorder  · Psychiatric  o Denies  o : anxiety, depression  · All Others Negative      Vitals  Date Time BP Position Site L\R Cuff Size HR RR TEMP (F) WT  HT  BMI kg/m2 BSA m2 O2 Sat FR L/min FiO2 HC       06/01/2021 01:20 PM        97.6 222lbs 16oz 5'  6\" 35.99 2.17             Physical Examination  · Constitutional  o Appearance  o : well-nourished, well developed, alert, in no acute distress  · Respiratory  o Respiratory Effort  o : breathing unlabored  · Cardiovascular  o Peripheral Vascular System  o :   § Extremities  § : no edema or cyanosis  · Musculoskeletal  o Right Lower Extremity  o :   § Inspection/Palpation  § : no joint or limb tenderness to palpation, no edema present, no ecchymosis  § Joint Stability  § : joint stability within normal limits  o Left Lower Extremity  o :   § Inspection/Palpation  § : no joint or limb tenderness to palpation, no edema present, no ecchymosis  § Joint Stability  § : joint stability within normal limits  · Skin and Subcutaneous Tissue  o Extremities  o :   § Right Lower Extremity  § : no lesions or areas of discoloration  § Left Lower Extremity  § : no lesions or areas of discoloration  o Back  o : no lesions or areas of discoloration  · Neurologic  o Mental Status Examination  o :   § Orientation  § : alert and oriented to time, person, place and events  o Motor Examination  o :   § RLE Strength  § : strength normal  § RLE Motor Function  § : tone normal, no atrophy, no abnormal movements noted  § LLE Strength  § : strength normal  § LLE Motor Function  § : tone normal, no atrophy, no abnormal movements noted  o Reflexes  o :   § RLE  § : knee and ankle reflexes tr/4, SLR negative  § LLE  § : knee and ankle reflexes tr/4, SLR negative  o Sensation  o :   § Light Touch  § : decreased to light touch to about mid-shin bilaterally  o Gait and Station  o :   § Gait Screening  § : normal gait, able to stand " without difficulty  · Psychiatric  o Mood and Affect  o : mood normal, affect appropriate     Patricks- BLE           Assessment  · Degeneration of lumbosacral intervertebral disc     722.52/M51.37  multilevel  · Lumbago/low back pain     724.3/M54.40  · Lumbar disc herniation, L2-3     722.10/M51.26  central      Plan  · Orders  o PHYSICAL THERAPY CONSULTATION (PHYTH) - - 06/01/2021   Lower back pain and multilevel DDD Evaluate and treat.  · Medications  o Medications have been Reconciled  o Transition of Care or Provider Policy  · Instructions  o Encouraged to follow-up with Primary Care Provider for preventative care.  o The ROS and the PFSH were reviewed at today's visit.  o I have discussed the risks and benefits of surgery versus physical therapy, epidural steroids, and other conservative forms of treatment.  o We are going to try a course of PT for the lower back pain. She will monitor for worsened pain into thigh (minimal at present).   o She will be careful with her activities at the present and slowly increase as feeling better.             Electronically Signed by: Franklyn Capps MD -Author on June 1, 2021 02:06:28 PM

## 2021-06-14 DIAGNOSIS — M51.37 DEGENERATION OF LUMBAR OR LUMBOSACRAL INTERVERTEBRAL DISC: Primary | ICD-10-CM

## 2021-06-14 DIAGNOSIS — M54.50 LOW BACK PAIN, UNSPECIFIED BACK PAIN LATERALITY, UNSPECIFIED CHRONICITY, UNSPECIFIED WHETHER SCIATICA PRESENT: ICD-10-CM

## 2021-06-14 DIAGNOSIS — M51.26 LUMBAR DISC HERNIATION: ICD-10-CM

## 2021-07-07 RX ORDER — DULAGLUTIDE 1.5 MG/.5ML
1.5 INJECTION, SOLUTION SUBCUTANEOUS
Qty: 4 PEN | Refills: 5 | Status: SHIPPED | OUTPATIENT
Start: 2021-07-07 | End: 2021-11-02 | Stop reason: SDUPTHER

## 2021-07-07 RX ORDER — DULAGLUTIDE 1.5 MG/.5ML
0.5 INJECTION, SOLUTION SUBCUTANEOUS
COMMUNITY
End: 2021-07-07 | Stop reason: SDUPTHER

## 2021-07-15 VITALS — WEIGHT: 223 LBS | HEIGHT: 66 IN | TEMPERATURE: 97.6 F | BODY MASS INDEX: 35.84 KG/M2

## 2021-07-27 ENCOUNTER — OFFICE VISIT (OUTPATIENT)
Dept: DIABETES SERVICES | Facility: HOSPITAL | Age: 66
End: 2021-07-27

## 2021-07-27 VITALS
WEIGHT: 220.9 LBS | DIASTOLIC BLOOD PRESSURE: 65 MMHG | SYSTOLIC BLOOD PRESSURE: 132 MMHG | TEMPERATURE: 98.4 F | BODY MASS INDEX: 36.8 KG/M2 | HEIGHT: 65 IN | OXYGEN SATURATION: 98 % | HEART RATE: 74 BPM

## 2021-07-27 DIAGNOSIS — E11.42 CONTROLLED TYPE 2 DIABETES MELLITUS WITH DIABETIC POLYNEUROPATHY, WITH LONG-TERM CURRENT USE OF INSULIN (HCC): Primary | ICD-10-CM

## 2021-07-27 DIAGNOSIS — Z79.4 CONTROLLED TYPE 2 DIABETES MELLITUS WITH DIABETIC POLYNEUROPATHY, WITH LONG-TERM CURRENT USE OF INSULIN (HCC): Primary | ICD-10-CM

## 2021-07-27 PROCEDURE — G0463 HOSPITAL OUTPT CLINIC VISIT: HCPCS | Performed by: NURSE PRACTITIONER

## 2021-07-27 PROCEDURE — 99213 OFFICE O/P EST LOW 20 MIN: CPT | Performed by: NURSE PRACTITIONER

## 2021-07-27 RX ORDER — SWAB
SWAB, NON-MEDICATED MISCELLANEOUS
COMMUNITY

## 2021-07-27 RX ORDER — ASPIRIN 81 MG/1
TABLET ORAL
COMMUNITY

## 2021-07-27 RX ORDER — INSULIN GLARGINE 300 U/ML
40 INJECTION, SOLUTION SUBCUTANEOUS DAILY
COMMUNITY
Start: 2021-06-18 | End: 2021-11-09 | Stop reason: SDUPTHER

## 2021-07-27 RX ORDER — DAPAGLIFLOZIN 10 MG/1
1 TABLET, FILM COATED ORAL DAILY
COMMUNITY
Start: 2021-07-19 | End: 2022-12-15 | Stop reason: SDUPTHER

## 2021-07-27 RX ORDER — GABAPENTIN 600 MG/1
600 TABLET ORAL 3 TIMES DAILY
COMMUNITY
Start: 2021-06-22

## 2021-07-27 NOTE — PROGRESS NOTES
Chief Complaint  Follow-up (No issues - A1C last week was 6.1)    Referred By: YULIA Zee presents to [facility] for diabetes medication management    History of Present Illness    Visit type:  follow-up  Diabetes type:  Type 2  Current diabetes status/concerns/issues:  She has no concerns today  Other health concerns: degenerative disc disease of lumbar spine; open wound on left foot - under care of podiatry.  She is wearing an ortho boot at today's visit.  Diabetes symptoms:  none reported at this time  Diabetes complications:  Neuropathy (of both lower extremities) and Amputation/Wounds (wound on left foot)  Hypoglycemia:  None reported at this time  Hypoglycemia Symptoms:  No hypoglycemia at this time  Current diabetes treatment:  She is currently managed using Trulicity 1.5 mg once weekly, Farxiga 10 mg once a day, Metformin 1000 mg twice a day, Januvia 100 mg once a day, and Toujeo 40 units once a day  Blood glucose device:  Meter  Blood glucose monitoring frequency:  2 -3  Blood glucose range/average:  Most glucose values are staying less than 150 mg/dl  Diet:  Avoids high carb/sweet foods, Diet drinks only  Activity:  None    Past Medical History:   Diagnosis Date   • AC (acromioclavicular) joint bone spurs, unspecified laterality    • DDD (degenerative disc disease), lumbar    • Diabetes mellitus type I (CMS/[facility])    • Hypertension      History reviewed. No pertinent surgical history.  Family History   Problem Relation Age of Onset   • No Known Problems Mother    • No Known Problems Father      Social History     Socioeconomic History   • Marital status:      Spouse name: Not on file   • Number of children: Not on file   • Years of education: Not on file   • Highest education level: Not on file   Tobacco Use   • Smoking status: Never Smoker   • Smokeless tobacco: Never Used   Substance and Sexual Activity   • Alcohol use:  No   • Drug use: No   • Sexual activity: Never     Allergies   Allergen Reactions   • Statins Mental Status Change       Current Outpatient Medications:   •  aspirin (aspirin) 81 MG EC tablet, aspirin 81 mg oral tablet,delayed release (DR/EC) take 1 tablet (81 mg) by oral route once daily   Active, Disp: , Rfl:   •  benazepril-hydrochlorthiazide (LOTENSIN HCT) 20-25 MG per tablet, , Disp: , Rfl: 1  •  Biotin 5 MG tablet dispersible, biotin 5,000 mcg oral tablet,disintegrating dissolve  1 tablet by oral route daily   Active, Disp: , Rfl:   •  CloNIDine (CATAPRES) 0.2 MG tablet, , Disp: , Rfl: 5  •  Dulaglutide (Trulicity) 1.5 MG/0.5ML solution pen-injector, Inject 1.5 mg under the skin into the appropriate area as directed Every 7 (Seven) Days., Disp: 4 pen, Rfl: 5  •  DULoxetine (CYMBALTA) 30 MG capsule, , Disp: , Rfl: 5  •  esomeprazole (nexIUM) 20 MG capsule, Nexium 20 mg oral capsule,delayed release(DR/EC) take 1 capsule (20 mg) by oral route once daily at least 1 hour before a meal swallowing whole. Do not crush or chew granules.   Active, Disp: , Rfl:   •  Farxiga 10 MG tablet, Take 1 tablet by mouth Daily., Disp: , Rfl:   •  gabapentin (NEURONTIN) 600 MG tablet, Take 600 mg by mouth 3 (Three) Times a Day., Disp: , Rfl:   •  JANUVIA 100 MG tablet, , Disp: , Rfl: 5  •  metFORMIN ER (GLUCOPHAGE-XR) 500 MG 24 hr tablet, , Disp: , Rfl: 5  •  metoclopramide (REGLAN) 10 MG tablet, Take 10 mg by mouth 4 (Four) Times a Day Before Meals & at Bedtime., Disp: , Rfl:   •  Omega-3 Fatty Acids (Fish Oil Concentrate) 1000 MG capsule, Fish Oil Concentrate 1,000 mg oral capsule take 1 capsule by oral route daily   Active, Disp: , Rfl:   •  ONE TOUCH ULTRA TEST test strip, , Disp: , Rfl: 11  •  Toujeo Max SoloStar 300 UNIT/ML solution pen-injector injection, Inject 40 Units under the skin into the appropriate area as directed Daily., Disp: , Rfl:     Review of Systems   Constitutional: Negative for activity change, appetite  "change, fatigue, fever, unexpected weight gain and unexpected weight loss.   HENT: Negative for congestion, ear pain, facial swelling, hearing loss, sore throat and tinnitus.    Eyes: Negative for blurred vision, double vision, redness and visual disturbance.   Respiratory: Negative for cough, shortness of breath and wheezing.    Cardiovascular: Negative for chest pain, palpitations and leg swelling.   Gastrointestinal: Negative for abdominal distention, constipation, diarrhea, nausea, vomiting, GERD and indigestion.   Endocrine: Negative for polydipsia, polyphagia and polyuria.   Genitourinary: Negative for difficulty urinating, frequency and urgency.   Musculoskeletal: Positive for arthralgias and back pain. Negative for gait problem and myalgias.   Skin: Positive for skin lesions (left foot with open wound). Negative for rash and bruise.   Neurological: Positive for numbness (legs and feet). Negative for seizures, speech difficulty, weakness, headache and confusion.   Psychiatric/Behavioral: Negative for sleep disturbance, depressed mood and stress. The patient is not nervous/anxious.         Objective     Vitals:    07/27/21 1337   BP: 132/65   BP Location: Right arm   Patient Position: Sitting   Pulse: 74   Temp: 98.4 °F (36.9 °C)   TempSrc: Oral   SpO2: 98%   Weight: 100 kg (220 lb 14.4 oz)  Comment: Has boot on foot   Height: 165.1 cm (65\")     Body mass index is 36.76 kg/m².      Physical Exam  Constitutional:       Appearance: Normal appearance. She is obese.      Comments: Obesity class II with BMI of 36.76   HENT:      Head: Normocephalic and atraumatic.      Right Ear: External ear normal.      Left Ear: External ear normal.      Nose: Nose normal.   Eyes:      Extraocular Movements: Extraocular movements intact.      Conjunctiva/sclera: Conjunctivae normal.   Pulmonary:      Effort: Pulmonary effort is normal.   Musculoskeletal:         General: Normal range of motion.      Cervical back: Normal range " of motion.      Comments: Left foot in ortho boot due to wound   Skin:     General: Skin is warm and dry.   Neurological:      General: No focal deficit present.      Mental Status: She is alert and oriented to person, place, and time. Mental status is at baseline.   Psychiatric:         Mood and Affect: Mood normal.         Behavior: Behavior normal.         Thought Content: Thought content normal.         Judgment: Judgment normal.         Result Review :   The following data was reviewed by: YULIA Burt on 07/27/2021:        She states she recently had an A1c collected at [facility] and it was 6.2% indicating controlled type 2 diabetes.  We will request a copy of this plan for our records.              Assessment:  Diagnoses and all orders for this visit:    1. Controlled type 2 diabetes mellitus with diabetic polyneuropathy, with long-term current use of insulin (CMS/[facility]) (Primary)        Plan: No changes were made to the patient's current treatment plan.  She will be scheduled for routine follow-up appointment in 3 months.    The patient will monitor her blood glucose levels 2-3 times each day.  If she experiences any increased frequency or severity of hypoglycemia, or worsening hyperglycemia, she will contact the office for further instructions.          Follow Up     Return in about 3 months (around 10/27/2021) for Medication Management.    Patient was given instructions and counseling regarding her condition or for health maintenance advice. Please see specific information pulled into the AVS if appropriate.     YULIA Burt  07/27/2021

## 2021-11-02 ENCOUNTER — OFFICE VISIT (OUTPATIENT)
Dept: DIABETES SERVICES | Facility: HOSPITAL | Age: 66
End: 2021-11-02

## 2021-11-02 VITALS
HEIGHT: 65 IN | DIASTOLIC BLOOD PRESSURE: 60 MMHG | TEMPERATURE: 98.1 F | SYSTOLIC BLOOD PRESSURE: 116 MMHG | WEIGHT: 216.27 LBS | OXYGEN SATURATION: 99 % | HEART RATE: 62 BPM | BODY MASS INDEX: 36.03 KG/M2

## 2021-11-02 DIAGNOSIS — E66.01 SEVERE OBESITY (BMI 35.0-39.9) WITH COMORBIDITY (HCC): ICD-10-CM

## 2021-11-02 DIAGNOSIS — E11.65 UNCONTROLLED TYPE 2 DIABETES MELLITUS WITH HYPERGLYCEMIA (HCC): Primary | ICD-10-CM

## 2021-11-02 LAB
EXPIRATION DATE: ABNORMAL
GLUCOSE BLDC GLUCOMTR-MCNC: 155 MG/DL (ref 70–99)
HBA1C MFR BLD: 6.2 %
Lab: ABNORMAL

## 2021-11-02 PROCEDURE — 99213 OFFICE O/P EST LOW 20 MIN: CPT | Performed by: NURSE PRACTITIONER

## 2021-11-02 PROCEDURE — G0463 HOSPITAL OUTPT CLINIC VISIT: HCPCS | Performed by: NURSE PRACTITIONER

## 2021-11-02 PROCEDURE — 83036 HEMOGLOBIN GLYCOSYLATED A1C: CPT | Performed by: NURSE PRACTITIONER

## 2021-11-02 PROCEDURE — 82962 GLUCOSE BLOOD TEST: CPT | Performed by: NURSE PRACTITIONER

## 2021-11-02 RX ORDER — DULAGLUTIDE 1.5 MG/.5ML
1.5 INJECTION, SOLUTION SUBCUTANEOUS
Qty: 4 PEN | Refills: 5 | Status: SHIPPED | OUTPATIENT
Start: 2021-11-02 | End: 2022-06-20 | Stop reason: SDUPTHER

## 2021-11-02 NOTE — PROGRESS NOTES
Chief Complaint  Diabetes (follow up and med refills, recent hospital stay while in [place], has heart monitor on for 30 days )    Referred By: YULIA Zee presents to [facility] for diabetes medication management    History of Present Illness    Visit type:  follow-up  Diabetes type:  Type 2  Current diabetes status/concerns/issues:  No concerns regarding her diabetes today.  Has had some weight loss of 4 pounds since last visit  Other health concerns: Had episode of right sided body numbness causing her to fall.  She had multiple test done but nothing was found.  Felt it may have been stressed induced.  She is wearing a Holter monitor for 30 days to rule out cardiac issues  Diabetes symptoms:    Polyuria: No   Polydipsia: No   Polyphagia: No   Blurred vision: No   Excessive fatigue: No  Diabetes complications:  Neuropathy:Yes, of the lower extremities  Nephropathy:No  Retinopathy:No  Amputation/Wounds:Yes, left foot wound has improved but is still present; she is under the care of podiatry  Gastroparesis:No  Cardiovascular Disease:Yes, HTN  Erectile Dysfunction:N/A  Hypoglycemia:  None reported at this time  Hypoglycemia Symptoms:  No hypoglycemia at this time  Current diabetes treatment:  Trulicity 1.5 mg once weekly, Farxiga 10 mg once a day, Metformin 1000 mg twice a day, Januvia 100 mg once a day, and Toujeo 40 units once a day  Blood glucose device:  Meter  Blood glucose monitoring frequency:  3  Blood glucose range/average:   per patient  Diet:  Avoids high carb/sweet foods, Diet drinks only  Activity:  None    Past Medical History:   Diagnosis Date   • AC (acromioclavicular) joint bone spurs, unspecified laterality    • DDD (degenerative disc disease), lumbar    • Hypertension    • Uncontrolled type 2 diabetes mellitus with hyperglycemia (HCC) 11/2/2021     History reviewed. No pertinent surgical history.  Family History    Problem Relation Age of Onset   • No Known Problems Mother    • No Known Problems Father      Social History     Socioeconomic History   • Marital status:    Tobacco Use   • Smoking status: Never Smoker   • Smokeless tobacco: Never Used   Substance and Sexual Activity   • Alcohol use: No   • Drug use: No   • Sexual activity: Never     Allergies   Allergen Reactions   • Statins Mental Status Change       Current Outpatient Medications:   •  aspirin (aspirin) 81 MG EC tablet, aspirin 81 mg oral tablet,delayed release (DR/EC) take 1 tablet (81 mg) by oral route once daily   Active, Disp: , Rfl:   •  benazepril-hydrochlorthiazide (LOTENSIN HCT) 20-25 MG per tablet, , Disp: , Rfl: 1  •  Biotin 5 MG tablet dispersible, biotin 5,000 mcg oral tablet,disintegrating dissolve  1 tablet by oral route daily   Active, Disp: , Rfl:   •  CloNIDine (CATAPRES) 0.2 MG tablet, , Disp: , Rfl: 5  •  Dulaglutide (Trulicity) 1.5 MG/0.5ML solution pen-injector, Inject 1.5 mg under the skin into the appropriate area as directed Every 7 (Seven) Days., Disp: 4 pen, Rfl: 5  •  DULoxetine (CYMBALTA) 30 MG capsule, , Disp: , Rfl: 5  •  esomeprazole (nexIUM) 20 MG capsule, Nexium 20 mg oral capsule,delayed release(DR/EC) take 1 capsule (20 mg) by oral route once daily at least 1 hour before a meal swallowing whole. Do not crush or chew granules.   Active, Disp: , Rfl:   •  Farxiga 10 MG tablet, Take 1 tablet by mouth Daily., Disp: , Rfl:   •  gabapentin (NEURONTIN) 600 MG tablet, Take 600 mg by mouth 3 (Three) Times a Day., Disp: , Rfl:   •  JANUVIA 100 MG tablet, , Disp: , Rfl: 5  •  metFORMIN ER (GLUCOPHAGE-XR) 500 MG 24 hr tablet, , Disp: , Rfl: 5  •  metoclopramide (REGLAN) 10 MG tablet, Take 10 mg by mouth 4 (Four) Times a Day Before Meals & at Bedtime., Disp: , Rfl:   •  Omega-3 Fatty Acids (Fish Oil Concentrate) 1000 MG capsule, Fish Oil Concentrate 1,000 mg oral capsule take 1 capsule by oral route daily   Active, Disp: , Rfl:   •  " ONE TOUCH ULTRA TEST test strip, , Disp: , Rfl: 11  •  Roderick Enriquez SoloStar 300 UNIT/ML solution pen-injector injection, Inject 40 Units under the skin into the appropriate area as directed Daily., Disp: , Rfl:     Review of Systems   Constitutional: Negative for activity change, appetite change, fatigue, fever, unexpected weight gain and unexpected weight loss.   HENT: Negative for congestion, ear pain, facial swelling, hearing loss, sore throat and tinnitus.    Eyes: Negative for blurred vision, double vision, redness and visual disturbance.   Respiratory: Negative for cough, shortness of breath and wheezing.    Cardiovascular: Negative for chest pain, palpitations and leg swelling.   Gastrointestinal: Negative for abdominal distention, constipation, diarrhea, nausea, vomiting, GERD and indigestion.   Endocrine: Negative for polydipsia, polyphagia and polyuria.   Genitourinary: Negative for difficulty urinating, frequency and urgency.   Musculoskeletal: Positive for back pain. Negative for gait problem and myalgias.   Skin: Negative for rash, skin lesions and bruise.   Neurological: Negative for seizures, speech difficulty, weakness, headache and confusion.   Psychiatric/Behavioral: Negative for sleep disturbance, depressed mood and stress. The patient is not nervous/anxious.         Objective     Vitals:    11/02/21 1303   BP: 116/60   BP Location: Left arm   Patient Position: Sitting   Cuff Size: Adult   Pulse: 62   Temp: 98.1 °F (36.7 °C)   SpO2: 99%   Weight: 98.1 kg (216 lb 4.3 oz)   Height: 165.1 cm (65\")   PainSc: 0-No pain     Body mass index is 35.99 kg/m².    Physical Exam  Constitutional:       Appearance: Normal appearance. She is obese.      Comments: Severe obesity with comorbidity of diabetes and hypertension with BMI of 35.99   HENT:      Head: Normocephalic and atraumatic.      Right Ear: External ear normal.      Left Ear: External ear normal.      Nose: Nose normal.   Eyes:      Extraocular " Movements: Extraocular movements intact.      Conjunctiva/sclera: Conjunctivae normal.   Pulmonary:      Effort: Pulmonary effort is normal.   Musculoskeletal:         General: Normal range of motion.      Cervical back: Normal range of motion.   Skin:     General: Skin is warm and dry.   Neurological:      General: No focal deficit present.      Mental Status: She is alert and oriented to person, place, and time. Mental status is at baseline.   Psychiatric:         Mood and Affect: Mood normal.         Behavior: Behavior normal.         Thought Content: Thought content normal.         Judgment: Judgment normal.         Result Review :   The following data was reviewed by: YULIA Burt on 11/02/2021:        Point-of-care A1c collected in the office today was 6.2% indicating controlled type 2 diabetes    Most Recent A1C    HGBA1C Most Recent 11/2/21   Hemoglobin A1C 6.2             A1C Last 3 Results    HGBA1C Last 3 Results 11/2/21   Hemoglobin A1C 6.2             Glucose   Date Value Ref Range Status   11/02/2021 155 (H) 70 - 99 mg/dL Final     Comment:     Serial Number: [id number]:  [id number]           Assessment: Patient has maintained well controlled A1c for an extended period of time now.      Diagnoses and all orders for this visit:    1. Uncontrolled type 2 diabetes mellitus with hyperglycemia (HCC) (Primary)  -     POC Glycosylated Hemoglobin (Hb A1C)    2. Severe obesity (BMI 35.0-39.9) with comorbidity (HCC)    Other orders  -     POC Glucose  -     Dulaglutide (Trulicity) 1.5 MG/0.5ML solution pen-injector; Inject 1.5 mg under the skin into the appropriate area as directed Every 7 (Seven) Days.  Dispense: 4 pen; Refill: 5        Plan: Refills were sent to the pharmacy for her Trulicity.  The patient be scheduled for follow-up appointment in 6 months.  She will contact our office if she experiences any problematic glucose levels in the interval between appointments.    The patient will  monitor her blood glucose levels 3 times each day.  If she develops hypoglycemia or experiences any increased frequency or severity of hypoglycemia, she will contact the office for further instructions.        Follow Up     Return in about 6 months (around 5/2/2022) for Medication Management.    Patient was given instructions and counseling regarding her condition or for health maintenance advice. Please see specific information pulled into the AVS if appropriate.     Catherine Fish, YULIA  11/02/2021

## 2021-11-08 ENCOUNTER — TELEPHONE (OUTPATIENT)
Dept: DIABETES SERVICES | Facility: HOSPITAL | Age: 66
End: 2021-11-08

## 2021-11-08 NOTE — TELEPHONE ENCOUNTER
PATIENT NEEDS REFILL ON HER TOUJEO. PLEASE SEND TO [facility] [place].   
No

## 2021-11-09 RX ORDER — INSULIN GLARGINE 300 U/ML
40 INJECTION, SOLUTION SUBCUTANEOUS DAILY
Qty: 2 PEN | Refills: 3 | Status: SHIPPED | OUTPATIENT
Start: 2021-11-09 | End: 2022-05-16 | Stop reason: SDUPTHER

## 2021-11-23 ENCOUNTER — TRANSCRIBE ORDERS (OUTPATIENT)
Dept: ADMINISTRATIVE | Facility: HOSPITAL | Age: 66
End: 2021-11-23

## 2021-11-23 DIAGNOSIS — Z12.31 VISIT FOR SCREENING MAMMOGRAM: Primary | ICD-10-CM

## 2022-03-11 ENCOUNTER — HOSPITAL ENCOUNTER (OUTPATIENT)
Dept: MAMMOGRAPHY | Facility: HOSPITAL | Age: 67
Discharge: HOME | End: 2022-03-11
Admitting: OBSTETRICS & GYNECOLOGY

## 2022-03-11 DIAGNOSIS — Z12.31 VISIT FOR SCREENING MAMMOGRAM: ICD-10-CM

## 2022-03-11 PROCEDURE — 77063 BREAST TOMOSYNTHESIS BI: CPT

## 2022-03-11 PROCEDURE — 77067 SCR MAMMO BI INCL CAD: CPT

## 2022-05-16 RX ORDER — INSULIN GLARGINE 300 U/ML
40 INJECTION, SOLUTION SUBCUTANEOUS DAILY
Qty: 2 PEN | Refills: 3 | Status: SHIPPED | OUTPATIENT
Start: 2022-05-16 | End: 2022-12-15 | Stop reason: SDUPTHER

## 2022-06-16 ENCOUNTER — OFFICE VISIT (OUTPATIENT)
Dept: DIABETES SERVICES | Facility: HOSPITAL | Age: 67
End: 2022-06-16

## 2022-06-16 VITALS
TEMPERATURE: 97.1 F | WEIGHT: 216.05 LBS | SYSTOLIC BLOOD PRESSURE: 106 MMHG | BODY MASS INDEX: 36 KG/M2 | HEART RATE: 64 BPM | DIASTOLIC BLOOD PRESSURE: 55 MMHG | OXYGEN SATURATION: 100 % | HEIGHT: 65 IN

## 2022-06-16 DIAGNOSIS — E11.65 UNCONTROLLED TYPE 2 DIABETES MELLITUS WITH HYPERGLYCEMIA: ICD-10-CM

## 2022-06-16 DIAGNOSIS — E11.42 CONTROLLED TYPE 2 DIABETES MELLITUS WITH DIABETIC POLYNEUROPATHY, WITH LONG-TERM CURRENT USE OF INSULIN: Primary | ICD-10-CM

## 2022-06-16 DIAGNOSIS — E66.01 SEVERE OBESITY (BMI 35.0-39.9) WITH COMORBIDITY: ICD-10-CM

## 2022-06-16 DIAGNOSIS — Z79.4 CONTROLLED TYPE 2 DIABETES MELLITUS WITH DIABETIC POLYNEUROPATHY, WITH LONG-TERM CURRENT USE OF INSULIN: Primary | ICD-10-CM

## 2022-06-16 LAB
EXPIRATION DATE: ABNORMAL
HBA1C MFR BLD: 6.5 %
Lab: ABNORMAL

## 2022-06-16 PROCEDURE — 83036 HEMOGLOBIN GLYCOSYLATED A1C: CPT | Performed by: NURSE PRACTITIONER

## 2022-06-16 PROCEDURE — 99213 OFFICE O/P EST LOW 20 MIN: CPT | Performed by: NURSE PRACTITIONER

## 2022-06-16 PROCEDURE — G0463 HOSPITAL OUTPT CLINIC VISIT: HCPCS | Performed by: NURSE PRACTITIONER

## 2022-06-16 RX ORDER — ROSUVASTATIN CALCIUM 40 MG/1
TABLET, COATED ORAL
COMMUNITY
Start: 2022-05-31

## 2022-06-16 RX ORDER — ACETAMINOPHEN AND CODEINE PHOSPHATE 300; 30 MG/1; MG/1
TABLET ORAL
COMMUNITY
Start: 2022-05-12

## 2022-06-16 RX ORDER — GABAPENTIN 800 MG/1
800 TABLET ORAL 3 TIMES DAILY
COMMUNITY
Start: 2022-06-14

## 2022-06-16 RX ORDER — DULOXETIN HYDROCHLORIDE 60 MG/1
CAPSULE, DELAYED RELEASE ORAL
COMMUNITY
Start: 2022-05-12

## 2022-06-16 RX ORDER — RIVAROXABAN 20 MG/1
20 TABLET, FILM COATED ORAL DAILY
COMMUNITY
Start: 2022-06-13

## 2022-06-16 NOTE — PROGRESS NOTES
Chief Complaint  Diabetes (F/u and med refills, )    Referred By: YULIA Zee presents to [facility] for diabetes medication management    History of Present Illness    Visit type:  follow-up  Diabetes type:  Type 2  Current diabetes status/concerns/issues: She denies any specific concerns regarding her diabetes today.  Other health concerns: No new health issues; she continues to struggle with back issues and is going to be undergoing a procedure to help her have some prolonged relief with her back pain next week.  Diabetes symptoms:    Polyuria: No   Polydipsia: No   Polyphagia: No   Blurred vision: No   Excessive fatigue: No  Diabetes complications:  Neuro: Neuropathy of the lower extremities  Renal: None  Eyes: None  Amputation/Wounds: Left foot wound  GI: None  Cardiovascular: Hypertension  ED: N/A  Other: None  Hypoglycemia:  None reported at this time  Hypoglycemia Symptoms:  No hypoglycemia at this time  Current diabetes treatment:  Trulicity 1.5 mg once weekly, Farxiga 10 mg once a day, Metformin 1000 mg twice a day, Januvia 100 mg once a day, and Toujeo 40 units once a day  Blood glucose device:  Meter  Blood glucose monitoring frequency:  3 - 4  Blood glucose range/average:  130's with rare higher numbers  Diet:  Avoids high carb/sweet foods, Avoids sugary drinks  Activity/Exercise:  None    Past Medical History:   Diagnosis Date   • AC (acromioclavicular) joint bone spurs, unspecified laterality    • DDD (degenerative disc disease), lumbar    • Hypertension    • Uncontrolled type 2 diabetes mellitus with hyperglycemia (HCC) 11/2/2021     History reviewed. No pertinent surgical history.  Family History   Problem Relation Age of Onset   • No Known Problems Mother    • No Known Problems Father      Social History     Socioeconomic History   • Marital status:    Tobacco Use   • Smoking status: Never Smoker   • Smokeless  tobacco: Never Used   Substance and Sexual Activity   • Alcohol use: No   • Drug use: No   • Sexual activity: Never     Allergies   Allergen Reactions   • Statins Mental Status Change       Current Outpatient Medications:   •  acetaminophen-codeine (TYLENOL #3) 300-30 MG per tablet, TAKE 1 THREE TIMES A DAY AS NEEDED FOR PAIN NOT CONTROLLED BY OTHER MEDS, Disp: , Rfl:   •  aspirin 81 MG EC tablet, aspirin 81 mg oral tablet,delayed release (DR/EC) take 1 tablet (81 mg) by oral route once daily   Active, Disp: , Rfl:   •  benazepril-hydrochlorthiazide (LOTENSIN HCT) 20-25 MG per tablet, , Disp: , Rfl: 1  •  Biotin 5 MG tablet dispersible, biotin 5,000 mcg oral tablet,disintegrating dissolve  1 tablet by oral route daily   Active, Disp: , Rfl:   •  CloNIDine (CATAPRES) 0.2 MG tablet, , Disp: , Rfl: 5  •  DULoxetine (CYMBALTA) 60 MG capsule, TAKE 1 CAPSULE BY MOUTH ONCE DAILY (DOSE INCREASE 5-12-22), Disp: , Rfl:   •  esomeprazole (nexIUM) 20 MG capsule, Nexium 20 mg oral capsule,delayed release(DR/EC) take 1 capsule (20 mg) by oral route once daily at least 1 hour before a meal swallowing whole. Do not crush or chew granules.   Active, Disp: , Rfl:   •  Farxiga 10 MG tablet, Take 1 tablet by mouth Daily., Disp: , Rfl:   •  gabapentin (NEURONTIN) 800 MG tablet, Take 800 mg by mouth 3 (Three) Times a Day., Disp: , Rfl:   •  JANUVIA 100 MG tablet, , Disp: , Rfl: 5  •  metFORMIN ER (GLUCOPHAGE-XR) 500 MG 24 hr tablet, , Disp: , Rfl: 5  •  metoclopramide (REGLAN) 10 MG tablet, Take 10 mg by mouth 4 (Four) Times a Day Before Meals & at Bedtime., Disp: , Rfl:   •  Omega-3 Fatty Acids (Fish Oil Concentrate) 1000 MG capsule, Fish Oil Concentrate 1,000 mg oral capsule take 1 capsule by oral route daily   Active, Disp: , Rfl:   •  ONE TOUCH ULTRA TEST test strip, , Disp: , Rfl: 11  •  rosuvastatin (CRESTOR) 40 MG tablet, TAKE 1 TABLET BY MOUTH ONCE A DAY (DOSE INCREASE 2-2-22), Disp: , Rfl:   •  Toujeo Max SoloStar 300 UNIT/ML  "solution pen-injector injection, Inject 40 Units under the skin into the appropriate area as directed Daily., Disp: 2 pen, Rfl: 3  •  Xarelto 20 MG tablet, Take 20 mg by mouth Daily., Disp: , Rfl:   •  Dulaglutide (Trulicity) 1.5 MG/0.5ML solution pen-injector, Inject 1.5 mg under the skin into the appropriate area as directed Every 7 (Seven) Days., Disp: 4 pen, Rfl: 5  •  DULoxetine (CYMBALTA) 30 MG capsule, , Disp: , Rfl: 5  •  gabapentin (NEURONTIN) 600 MG tablet, Take 600 mg by mouth 3 (Three) Times a Day., Disp: , Rfl:     Review of Systems   Constitutional: Negative for activity change, appetite change, fatigue, fever, unexpected weight gain and unexpected weight loss.   HENT: Negative for congestion, ear pain, facial swelling, hearing loss, sore throat and tinnitus.    Eyes: Negative for blurred vision, double vision, redness and visual disturbance.   Respiratory: Negative for cough, shortness of breath and wheezing.    Cardiovascular: Negative for chest pain, palpitations and leg swelling.   Gastrointestinal: Negative for abdominal distention, constipation, diarrhea, nausea, vomiting, GERD and indigestion.   Endocrine: Negative for polydipsia, polyphagia and polyuria.   Genitourinary: Negative for difficulty urinating, frequency and urgency.   Musculoskeletal: Positive for back pain. Negative for gait problem and myalgias.   Skin: Negative for rash, skin lesions and wound.   Neurological: Negative for seizures, speech difficulty, weakness, headache and confusion.   Psychiatric/Behavioral: Negative for sleep disturbance, depressed mood and stress. The patient is not nervous/anxious.         Objective     Vitals:    06/16/22 1050   BP: 106/55   BP Location: Left arm   Patient Position: Sitting   Cuff Size: Adult   Pulse: 64   Temp: 97.1 °F (36.2 °C)   SpO2: 100%   Weight: 98 kg (216 lb 0.8 oz)   Height: 165.1 cm (65\")   PainSc:   2     Body mass index is 35.95 kg/m².    Physical Exam  Constitutional:       " Appearance: Normal appearance. She is obese.      Comments: Severe obesity with comorbidity of diabetes and hypertension with BMI of 35.95   HENT:      Head: Normocephalic and atraumatic.      Right Ear: External ear normal.      Left Ear: External ear normal.      Nose: Nose normal.   Eyes:      Extraocular Movements: Extraocular movements intact.      Conjunctiva/sclera: Conjunctivae normal.   Pulmonary:      Effort: Pulmonary effort is normal.   Musculoskeletal:         General: Normal range of motion.      Cervical back: Normal range of motion.   Skin:     General: Skin is warm and dry.   Neurological:      General: No focal deficit present.      Mental Status: She is alert and oriented to person, place, and time. Mental status is at baseline.   Psychiatric:         Mood and Affect: Mood normal.         Behavior: Behavior normal.         Thought Content: Thought content normal.         Judgment: Judgment normal.         Result Review :   The following data was reviewed by: YULIA Burt on 06/16/2022:    Point-of-care A1c in the office today was 6.5% indicating controlled type 2 diabetes.  This is up slightly from the prior result of 6.2 collected in November 2021    Most Recent A1C    HGBA1C Most Recent 6/16/22   Hemoglobin A1C 6.5             A1C Last 3 Results    HGBA1C Last 3 Results 11/2/21 6/16/22   Hemoglobin A1C 6.2 6.5             Glucose   Date Value Ref Range Status   11/02/2021 155 (H) 70 - 99 mg/dL Final     Comment:     Serial Number: [id number]:  [id number]             Assessment: Patient's A1c remains well controlled.  She is tolerating her medications without difficulty      Diagnoses and all orders for this visit:    1. Controlled type 2 diabetes mellitus with diabetic polyneuropathy, with long-term current use of insulin (HCC) (Primary)  -     POC Glycosylated Hemoglobin (Hb A1C)    2. Uncontrolled type 2 diabetes mellitus with hyperglycemia ([facility])    3. Severe obesity (BMI  35.0-39.9) with comorbidity (HCC)        Plan: No changes were made to her treatment plan.  She will focus on diet and physical activity to help control glucose levels.  She will be scheduled for routine follow-up appointment    The patient will monitor her blood glucose levels 2-3 times each day.  If she develops problematic hyperglycemia or hypoglycemia or adverse drug reactions, she will contact the office for further instructions.        Follow Up     Return in about 6 months (around 12/16/2022) for Medication Management.    Patient was given instructions and counseling regarding her condition or for health maintenance advice. Please see specific information pulled into the AVS if appropriate.     Catherine Fish, APRN  06/16/2022

## 2022-06-20 RX ORDER — DULAGLUTIDE 1.5 MG/.5ML
1.5 INJECTION, SOLUTION SUBCUTANEOUS
Qty: 4 PEN | Refills: 5 | Status: SHIPPED | OUTPATIENT
Start: 2022-06-20 | End: 2022-12-15 | Stop reason: SDUPTHER

## 2022-12-15 ENCOUNTER — OFFICE VISIT (OUTPATIENT)
Dept: DIABETES SERVICES | Facility: HOSPITAL | Age: 67
End: 2022-12-15

## 2022-12-15 VITALS
TEMPERATURE: 97 F | SYSTOLIC BLOOD PRESSURE: 124 MMHG | HEIGHT: 63 IN | OXYGEN SATURATION: 100 % | BODY MASS INDEX: 37.92 KG/M2 | HEART RATE: 56 BPM | WEIGHT: 214 LBS | DIASTOLIC BLOOD PRESSURE: 70 MMHG

## 2022-12-15 DIAGNOSIS — E11.42 CONTROLLED TYPE 2 DIABETES MELLITUS WITH DIABETIC POLYNEUROPATHY, WITH LONG-TERM CURRENT USE OF INSULIN: Primary | ICD-10-CM

## 2022-12-15 DIAGNOSIS — Z79.4 CONTROLLED TYPE 2 DIABETES MELLITUS WITH DIABETIC POLYNEUROPATHY, WITH LONG-TERM CURRENT USE OF INSULIN: Primary | ICD-10-CM

## 2022-12-15 DIAGNOSIS — E66.01 SEVERE OBESITY (BMI 35.0-39.9) WITH COMORBIDITY: ICD-10-CM

## 2022-12-15 PROBLEM — H69.93 ETD (EUSTACHIAN TUBE DYSFUNCTION), BILATERAL: Status: ACTIVE | Noted: 2022-12-15

## 2022-12-15 PROBLEM — M41.9 SCOLIOSIS: Status: ACTIVE | Noted: 2022-12-15

## 2022-12-15 PROBLEM — Z86.31 HISTORY OF DIABETIC ULCER OF FOOT: Status: ACTIVE | Noted: 2022-12-15

## 2022-12-15 PROBLEM — I63.9 CEREBROVASCULAR ACCIDENT (CVA): Status: ACTIVE | Noted: 2022-12-15

## 2022-12-15 PROBLEM — H69.83 ETD (EUSTACHIAN TUBE DYSFUNCTION), BILATERAL: Status: ACTIVE | Noted: 2022-12-15

## 2022-12-15 PROBLEM — E78.5 HYPERLIPIDEMIA: Status: ACTIVE | Noted: 2022-12-15

## 2022-12-15 PROBLEM — J00 COMMON COLD: Status: ACTIVE | Noted: 2022-12-15

## 2022-12-15 PROBLEM — G62.9 PERIPHERAL NERVE DISEASE: Status: ACTIVE | Noted: 2022-12-15

## 2022-12-15 PROBLEM — M25.552 PAIN IN LEFT HIP: Status: ACTIVE | Noted: 2022-12-15

## 2022-12-15 PROBLEM — J01.90 ACUTE SINUSITIS: Status: ACTIVE | Noted: 2022-12-15

## 2022-12-15 PROBLEM — N39.0 UTI (URINARY TRACT INFECTION): Status: ACTIVE | Noted: 2022-12-15

## 2022-12-15 PROBLEM — Z20.822 EXPOSURE TO COVID-19 VIRUS: Status: ACTIVE | Noted: 2022-12-15

## 2022-12-15 PROBLEM — M54.50 LOW BACK PAIN: Status: ACTIVE | Noted: 2022-12-15

## 2022-12-15 PROBLEM — L08.9 SKIN INFECTION: Status: ACTIVE | Noted: 2022-12-15

## 2022-12-15 PROBLEM — I10 HYPERTENSION: Status: ACTIVE | Noted: 2022-12-15

## 2022-12-15 PROBLEM — E11.9 TYPE 2 DIABETES MELLITUS: Status: ACTIVE | Noted: 2022-12-15

## 2022-12-15 PROBLEM — I48.91 ATRIAL FIBRILLATION (HCC): Status: ACTIVE | Noted: 2022-12-15

## 2022-12-15 PROBLEM — G47.30 SLEEP APNEA: Status: ACTIVE | Noted: 2022-12-15

## 2022-12-15 PROBLEM — E78.5 DYSLIPIDEMIA: Status: ACTIVE | Noted: 2022-12-15

## 2022-12-15 PROCEDURE — 99213 OFFICE O/P EST LOW 20 MIN: CPT | Performed by: NURSE PRACTITIONER

## 2022-12-15 PROCEDURE — G0463 HOSPITAL OUTPT CLINIC VISIT: HCPCS | Performed by: NURSE PRACTITIONER

## 2022-12-15 RX ORDER — METFORMIN HYDROCHLORIDE 500 MG/1
1000 TABLET, EXTENDED RELEASE ORAL
Qty: 180 TABLET | Refills: 1 | Status: SHIPPED | OUTPATIENT
Start: 2022-12-15

## 2022-12-15 RX ORDER — DULAGLUTIDE 1.5 MG/.5ML
1.5 INJECTION, SOLUTION SUBCUTANEOUS
Qty: 6 ML | Refills: 1 | Status: SHIPPED | OUTPATIENT
Start: 2022-12-15

## 2022-12-15 RX ORDER — INSULIN GLARGINE 300 U/ML
40 INJECTION, SOLUTION SUBCUTANEOUS DAILY
Qty: 15 ML | Refills: 1 | Status: SHIPPED | OUTPATIENT
Start: 2022-12-15

## 2022-12-15 RX ORDER — DAPAGLIFLOZIN 10 MG/1
1 TABLET, FILM COATED ORAL DAILY
Qty: 90 TABLET | Refills: 1 | Status: SHIPPED | OUTPATIENT
Start: 2022-12-15

## 2022-12-15 RX ORDER — SITAGLIPTIN 100 MG/1
100 TABLET, FILM COATED ORAL DAILY
Qty: 90 TABLET | Refills: 1 | Status: SHIPPED | OUTPATIENT
Start: 2022-12-15

## 2022-12-15 NOTE — PROGRESS NOTES
"Chief Complaint  Diabetes (Follow up, med mgt, )    Referred By: YULIA Zee presents to [facility] for diabetes medication management    History of Present Illness    Visit type:  follow-up  Diabetes type:  Type 2  Current diabetes status/concerns/issues: She denies any complications related to her diabetes specifically.  Other health concerns: She injured her foot after fall.  She is being seen by podiatry services.  Diabetes symptoms:    Polyuria: No   Polydipsia: No   Polyphagia: No   Blurred vision: No   Excessive fatigue: No   Diabetes complications:  Neuro: Neuropathy of the lower extremities  Renal: None  Eyes: None  Amputation/Wounds: Left foot wound which is healing well  GI: None  Cardiovascular: Hypertension  ED: N/A   Other: None  Hypoglycemia:  None reported at this time  Hypoglycemia Symptoms:  No hypoglycemia at this time  Current diabetes treatment:  Trulicity 1.5 mg once weekly, Farxiga 10 mg once a day, Metformin 1000 mg twice a day, Januvia 100 mg once a day, and Toujeo 40 units once a day  Blood glucose device:  Meter  Blood glucose monitoring frequency:  3 - 4  Blood glucose range/average:  Staying below 150 on most occasions  Diet:  Avoids sugary drinks, \"Eat what I want\"/No diet plan  Activity/Exercise:  None    Past Medical History:   Diagnosis Date   • AC (acromioclavicular) joint bone spurs, unspecified laterality    • DDD (degenerative disc disease), lumbar    • Hypertension    • Uncontrolled type 2 diabetes mellitus with hyperglycemia ([facility]) 11/2/2021     History reviewed. No pertinent surgical history.  Family History   Problem Relation Age of Onset   • No Known Problems Mother    • No Known Problems Father      Social History     Socioeconomic History   • Marital status:    Tobacco Use   • Smoking status: Never   • Smokeless tobacco: Never   Substance and Sexual Activity   • Alcohol use: No   • Drug " use: No   • Sexual activity: Never     Allergies   Allergen Reactions   • Statins Mental Status Change       Current Outpatient Medications:   •  acetaminophen-codeine (TYLENOL #3) 300-30 MG per tablet, TAKE 1 THREE TIMES A DAY AS NEEDED FOR PAIN NOT CONTROLLED BY OTHER MEDS, Disp: , Rfl:   •  aspirin 81 MG EC tablet, aspirin 81 mg oral tablet,delayed release (DR/EC) take 1 tablet (81 mg) by oral route once daily   Active, Disp: , Rfl:   •  benazepril-hydrochlorthiazide (LOTENSIN HCT) 20-25 MG per tablet, , Disp: , Rfl: 1  •  Biotin 5 MG tablet dispersible, biotin 5,000 mcg oral tablet,disintegrating dissolve  1 tablet by oral route daily   Active, Disp: , Rfl:   •  CloNIDine (CATAPRES) 0.2 MG tablet, , Disp: , Rfl: 5  •  Dulaglutide (Trulicity) 1.5 MG/0.5ML solution pen-injector, Inject 1.5 mg under the skin into the appropriate area as directed Every 7 (Seven) Days., Disp: 6 mL, Rfl: 1  •  DULoxetine (CYMBALTA) 30 MG capsule, , Disp: , Rfl: 5  •  DULoxetine (CYMBALTA) 60 MG capsule, TAKE 1 CAPSULE BY MOUTH ONCE DAILY (DOSE INCREASE 5-12-22), Disp: , Rfl:   •  esomeprazole (nexIUM) 20 MG capsule, Nexium 20 mg oral capsule,delayed release(DR/EC) take 1 capsule (20 mg) by oral route once daily at least 1 hour before a meal swallowing whole. Do not crush or chew granules.   Active, Disp: , Rfl:   •  Farxiga 10 MG tablet, Take 10 mg by mouth Daily., Disp: 90 tablet, Rfl: 1  •  gabapentin (NEURONTIN) 600 MG tablet, Take 600 mg by mouth 3 (Three) Times a Day., Disp: , Rfl:   •  gabapentin (NEURONTIN) 800 MG tablet, Take 800 mg by mouth 3 (Three) Times a Day., Disp: , Rfl:   •  Januvia 100 MG tablet, Take 1 tablet by mouth Daily., Disp: 90 tablet, Rfl: 1  •  metFORMIN ER (GLUCOPHAGE-XR) 500 MG 24 hr tablet, Take 2 tablets by mouth Daily With Breakfast & Dinner., Disp: 180 tablet, Rfl: 1  •  metoclopramide (REGLAN) 10 MG tablet, Take 10 mg by mouth 4 (Four) Times a Day Before Meals & at Bedtime., Disp: , Rfl:   •  Omega-3  "Fatty Acids (Fish Oil Concentrate) 1000 MG capsule, Fish Oil Concentrate 1,000 mg oral capsule take 1 capsule by oral route daily   Active, Disp: , Rfl:   •  ONE TOUCH ULTRA TEST test strip, , Disp: , Rfl: 11  •  rosuvastatin (CRESTOR) 40 MG tablet, TAKE 1 TABLET BY MOUTH ONCE A DAY (DOSE INCREASE 2-2-22), Disp: , Rfl:   •  Toujeo Max SoloStar 300 UNIT/ML solution pen-injector injection, Inject 40 Units under the skin into the appropriate area as directed Daily., Disp: 15 mL, Rfl: 1  •  Xarelto 20 MG tablet, Take 20 mg by mouth Daily., Disp: , Rfl:     Review of Systems   Constitutional: Negative for activity change, appetite change, fatigue, unexpected weight gain and unexpected weight loss.   Eyes: Negative for blurred vision and visual disturbance.   Gastrointestinal: Negative for abdominal pain, constipation, diarrhea, nausea, vomiting, GERD and indigestion.   Endocrine: Negative for polydipsia, polyphagia and polyuria.   Neurological: Negative for numbness.        Objective     Vitals:    12/15/22 1106   BP: 124/70   BP Location: Right arm   Patient Position: Sitting   Cuff Size: Adult   Pulse: 56   Temp: 97 °F (36.1 °C)   TempSrc: Temporal   SpO2: 100%   Weight: 97.1 kg (214 lb)   Height: 158.8 cm (62.5\")   PainSc:   2     Body mass index is 38.52 kg/m².    Physical Exam  Constitutional:       Appearance: Normal appearance. She is obese.      Comments: Severe obesity with comorbidity of diabetes and hypertension with BMI of 38.52   HENT:      Head: Normocephalic and atraumatic.      Right Ear: External ear normal.      Left Ear: External ear normal.      Nose: Nose normal.   Eyes:      Extraocular Movements: Extraocular movements intact.      Conjunctiva/sclera: Conjunctivae normal.   Pulmonary:      Effort: Pulmonary effort is normal.   Musculoskeletal:         General: Normal range of motion.      Cervical back: Normal range of motion.   Skin:     General: Skin is warm and dry.   Neurological:      General: " No focal deficit present.      Mental Status: She is alert and oriented to person, place, and time. Mental status is at baseline.   Psychiatric:         Mood and Affect: Mood normal.         Behavior: Behavior normal.         Thought Content: Thought content normal.         Judgment: Judgment normal.         Result Review :   The following data was reviewed by: YULIA Burt on 12/15/2022:    Most Recent A1C    HGBA1C Most Recent 6/16/22   Hemoglobin A1C 6.5             A1C Last 3 Results    HGBA1C Last 3 Results 6/16/22   Hemoglobin A1C 6.5           An A1c collected on 10/17/2022 was 6.5% indicating controlled type 2 diabetes.  This is unchanged from the prior result of 6.5% collected in June of this year.  A copy of these labs have been scanned into the patient's record.          Assessment: Her A1c remains very well controlled without hypoglycemia.  She is tolerating her medications without difficulty.      Diagnoses and all orders for this visit:    1. Controlled type 2 diabetes mellitus with diabetic polyneuropathy, with long-term current use of insulin (HCC) (Primary)  -     Dulaglutide (Trulicity) 1.5 MG/0.5ML solution pen-injector; Inject 1.5 mg under the skin into the appropriate area as directed Every 7 (Seven) Days.  Dispense: 6 mL; Refill: 1  -     Farxiga 10 MG tablet; Take 10 mg by mouth Daily.  Dispense: 90 tablet; Refill: 1  -     Januvia 100 MG tablet; Take 1 tablet by mouth Daily.  Dispense: 90 tablet; Refill: 1  -     metFORMIN ER (GLUCOPHAGE-XR) 500 MG 24 hr tablet; Take 2 tablets by mouth Daily With Breakfast & Dinner.  Dispense: 180 tablet; Refill: 1  -     Toujeo Max SoloStar 300 UNIT/ML solution pen-injector injection; Inject 40 Units under the skin into the appropriate area as directed Daily.  Dispense: 15 mL; Refill: 1    2. Severe obesity (BMI 35.0-39.9) with comorbidity (HCC)        Plan: No changes were made to her treatment plan today.  Prescriptions were sent to her  pharmacy for refills.    The patient will monitor her blood glucose levels 3-4 times a day.  If she develops problematic hyperglycemia or hypoglycemia or adverse drug reactions, she will contact the office for further instructions.        Follow Up     No follow-ups on file.    Patient was given instructions and counseling regarding her condition or for health maintenance advice. Please see specific information pulled into the AVS if appropriate.     Catherine Fish, YULIA  12/15/2022      Dictated Utilizing Dragon Dictation.  Please note that portions of this note were completed with a voice recognition program.  Part of this note may be an electronic transcription/translation of spoken language to printed text using the Dragon Dictation System.

## 2023-01-04 ENCOUNTER — TELEPHONE (OUTPATIENT)
Dept: DIABETES SERVICES | Facility: HOSPITAL | Age: 68
End: 2023-01-04
Payer: MEDICARE

## 2023-01-04 NOTE — TELEPHONE ENCOUNTER
Returned pt's pharmacy phone call. Pharmacy requested clarification on instructions for metformin.   Per Catherine's last visit note pt is to be taking Metformin 1000mg 2x's daily with breakfast and dinner. Pharmacy understood and said thank you.

## 2023-01-18 ENCOUNTER — TELEPHONE (OUTPATIENT)
Dept: DIABETES SERVICES | Facility: HOSPITAL | Age: 68
End: 2023-01-18
Payer: MEDICARE

## 2023-01-18 NOTE — TELEPHONE ENCOUNTER
Pt called and stated that her insurance will not cover her Trulicity because of the Jardiance and asked me to inform you and to please advise

## 2023-02-03 ENCOUNTER — TELEPHONE (OUTPATIENT)
Dept: DIABETES SERVICES | Facility: HOSPITAL | Age: 68
End: 2023-02-03
Payer: MEDICARE

## 2023-02-03 ENCOUNTER — TRANSCRIBE ORDERS (OUTPATIENT)
Dept: ADMINISTRATIVE | Facility: HOSPITAL | Age: 68
End: 2023-02-03
Payer: MEDICARE

## 2023-02-03 DIAGNOSIS — Z12.31 SCREENING MAMMOGRAM FOR BREAST CANCER: Primary | ICD-10-CM

## 2023-02-03 NOTE — TELEPHONE ENCOUNTER
ZHOU NOGUERA [employer] [place] GALEANA Ortiz: [id number] - [place] Case ID: [id number]  Trulicity 1.5MG/0.5ML pen-injectors      Approvedtoday  PA Case: [id number], Status: Approved, Coverage Starts on: 2/3/2023 1:51:23 PM, Coverage Ends on: 2/3/2023 1:51:23 PM. Questions? Contact [phone].

## 2023-02-16 ENCOUNTER — TRANSCRIBE ORDERS (OUTPATIENT)
Dept: ADMINISTRATIVE | Facility: HOSPITAL | Age: 68
End: 2023-02-16
Payer: MEDICARE

## 2023-02-16 DIAGNOSIS — N95.0 PMB (POSTMENOPAUSAL BLEEDING): Primary | ICD-10-CM

## 2023-03-15 ENCOUNTER — HOSPITAL ENCOUNTER (OUTPATIENT)
Dept: ULTRASOUND IMAGING | Facility: HOSPITAL | Age: 68
Discharge: HOME | End: 2023-03-15
Admitting: OBSTETRICS & GYNECOLOGY
Payer: MEDICARE

## 2023-03-15 DIAGNOSIS — N95.0 PMB (POSTMENOPAUSAL BLEEDING): ICD-10-CM

## 2023-03-15 PROCEDURE — 76830 TRANSVAGINAL US NON-OB: CPT

## 2023-03-15 PROCEDURE — 76856 US EXAM PELVIC COMPLETE: CPT

## 2023-04-10 DIAGNOSIS — Z79.4 CONTROLLED TYPE 2 DIABETES MELLITUS WITH DIABETIC POLYNEUROPATHY, WITH LONG-TERM CURRENT USE OF INSULIN: ICD-10-CM

## 2023-04-10 DIAGNOSIS — E11.42 CONTROLLED TYPE 2 DIABETES MELLITUS WITH DIABETIC POLYNEUROPATHY, WITH LONG-TERM CURRENT USE OF INSULIN: ICD-10-CM

## 2023-04-10 RX ORDER — DULAGLUTIDE 1.5 MG/.5ML
1.5 INJECTION, SOLUTION SUBCUTANEOUS
Qty: 6 ML | Refills: 1 | Status: SHIPPED | OUTPATIENT
Start: 2023-04-10

## 2023-05-03 ENCOUNTER — HOSPITAL ENCOUNTER (OUTPATIENT)
Dept: MAMMOGRAPHY | Facility: HOSPITAL | Age: 68
Discharge: HOME | End: 2023-05-03
Payer: MEDICARE

## 2023-05-03 DIAGNOSIS — Z12.31 SCREENING MAMMOGRAM FOR BREAST CANCER: ICD-10-CM

## 2023-05-03 PROCEDURE — 77063 BREAST TOMOSYNTHESIS BI: CPT

## 2023-05-03 PROCEDURE — 77067 SCR MAMMO BI INCL CAD: CPT

## 2023-07-21 RX ORDER — LANOLIN ALCOHOL/MO/W.PET/CERES
325 CREAM (GRAM) TOPICAL NIGHTLY
COMMUNITY

## 2023-07-28 ENCOUNTER — HOSPITAL ENCOUNTER (OUTPATIENT)
Dept: CT IMAGING | Facility: HOSPITAL | Age: 68
Discharge: HOME | End: 2023-07-28
Payer: MEDICARE

## 2023-07-28 VITALS
DIASTOLIC BLOOD PRESSURE: 91 MMHG | SYSTOLIC BLOOD PRESSURE: 154 MMHG | WEIGHT: 191 LBS | HEART RATE: 108 BPM | OXYGEN SATURATION: 97 % | TEMPERATURE: 98.2 F | BODY MASS INDEX: 35.15 KG/M2 | HEIGHT: 62 IN | RESPIRATION RATE: 18 BRPM

## 2023-07-28 DIAGNOSIS — N17.9 ACUTE RENAL FAILURE, UNSPECIFIED ACUTE RENAL FAILURE TYPE: ICD-10-CM

## 2023-07-28 LAB
ANION GAP SERPL CALCULATED.3IONS-SCNC: 13 MMOL/L (ref 5–15)
BASOPHILS # BLD AUTO: 0.08 10*3/MM3 (ref 0–0.2)
BASOPHILS NFR BLD AUTO: 0.9 % (ref 0–1.5)
BUN SERPL-MCNC: 27 MG/DL (ref 8–23)
BUN/CREAT SERPL: 8.3 (ref 7–25)
CALCIUM SPEC-SCNC: 9.3 MG/DL (ref 8.6–10.5)
CHLORIDE SERPL-SCNC: 105 MMOL/L (ref 98–107)
CO2 SERPL-SCNC: 23 MMOL/L (ref 22–29)
CREAT SERPL-MCNC: 3.27 MG/DL (ref 0.57–1)
DEPRECATED RDW RBC AUTO: 51.7 FL (ref 37–54)
EGFRCR SERPLBLD CKD-EPI 2021: 14.8 ML/MIN/1.73
EOSINOPHIL # BLD AUTO: 0.18 10*3/MM3 (ref 0–0.4)
EOSINOPHIL NFR BLD AUTO: 2.1 % (ref 0.3–6.2)
ERYTHROCYTE [DISTWIDTH] IN BLOOD BY AUTOMATED COUNT: 13.8 % (ref 12.3–15.4)
GLUCOSE SERPL-MCNC: 165 MG/DL (ref 65–99)
HCT VFR BLD AUTO: 28.5 % (ref 34–46.6)
HGB BLD-MCNC: 8.9 G/DL (ref 12–15.9)
IMM GRANULOCYTES # BLD AUTO: 0.03 10*3/MM3 (ref 0–0.05)
IMM GRANULOCYTES NFR BLD AUTO: 0.3 % (ref 0–0.5)
INR PPP: 1.1 (ref 0.8–1.2)
LYMPHOCYTES # BLD AUTO: 1.95 10*3/MM3 (ref 0.7–3.1)
LYMPHOCYTES NFR BLD AUTO: 22.5 % (ref 19.6–45.3)
MCH RBC QN AUTO: 31.9 PG (ref 26.6–33)
MCHC RBC AUTO-ENTMCNC: 31.2 G/DL (ref 31.5–35.7)
MCV RBC AUTO: 102.2 FL (ref 79–97)
MONOCYTES # BLD AUTO: 0.62 10*3/MM3 (ref 0.1–0.9)
MONOCYTES NFR BLD AUTO: 7.2 % (ref 5–12)
NEUTROPHILS NFR BLD AUTO: 5.81 10*3/MM3 (ref 1.7–7)
NEUTROPHILS NFR BLD AUTO: 67 % (ref 42.7–76)
NRBC BLD AUTO-RTO: 0 /100 WBC (ref 0–0.2)
PLATELET # BLD AUTO: 255 10*3/MM3 (ref 140–450)
PMV BLD AUTO: 10 FL (ref 6–12)
POTASSIUM SERPL-SCNC: 4.5 MMOL/L (ref 3.5–5.2)
PROTHROMBIN TIME: 13.5 SECONDS (ref 12.8–15.2)
RBC # BLD AUTO: 2.79 10*6/MM3 (ref 3.77–5.28)
SODIUM SERPL-SCNC: 141 MMOL/L (ref 136–145)
WBC NRBC COR # BLD: 8.67 10*3/MM3 (ref 3.4–10.8)

## 2023-07-28 PROCEDURE — 80048 BASIC METABOLIC PNL TOTAL CA: CPT | Performed by: RADIOLOGY

## 2023-07-28 PROCEDURE — 25010000002 MIDAZOLAM PER 1 MG: Performed by: RADIOLOGY

## 2023-07-28 PROCEDURE — 85610 PROTHROMBIN TIME: CPT

## 2023-07-28 PROCEDURE — 0 LIDOCAINE 1 % SOLUTION: Performed by: RADIOLOGY

## 2023-07-28 PROCEDURE — 25010000002 DESMOPRESSIN ACETATE PF 4 MCG/ML SOLUTION 1 ML AMPULE: Performed by: RADIOLOGY

## 2023-07-28 PROCEDURE — 85025 COMPLETE CBC W/AUTO DIFF WBC: CPT | Performed by: RADIOLOGY

## 2023-07-28 PROCEDURE — 77012 CT SCAN FOR NEEDLE BIOPSY: CPT

## 2023-07-28 PROCEDURE — 25010000002 FENTANYL CITRATE (PF) 50 MCG/ML SOLUTION: Performed by: RADIOLOGY

## 2023-07-28 RX ORDER — FENTANYL CITRATE 50 UG/ML
INJECTION, SOLUTION INTRAMUSCULAR; INTRAVENOUS
Status: DISPENSED
Start: 2023-07-28 | End: 2023-07-28

## 2023-07-28 RX ORDER — LIDOCAINE HYDROCHLORIDE 10 MG/ML
10 INJECTION, SOLUTION INFILTRATION; PERINEURAL ONCE
Status: COMPLETED | OUTPATIENT
Start: 2023-07-28 | End: 2023-07-28

## 2023-07-28 RX ORDER — SODIUM CHLORIDE 9 MG/ML
40 INJECTION, SOLUTION INTRAVENOUS AS NEEDED
Status: DISCONTINUED | OUTPATIENT
Start: 2023-07-28 | End: 2023-07-29 | Stop reason: HOSPADM

## 2023-07-28 RX ORDER — SODIUM CHLORIDE 0.9 % (FLUSH) 0.9 %
10 SYRINGE (ML) INJECTION EVERY 12 HOURS SCHEDULED
Status: DISCONTINUED | OUTPATIENT
Start: 2023-07-28 | End: 2023-07-29 | Stop reason: HOSPADM

## 2023-07-28 RX ORDER — MIDAZOLAM HYDROCHLORIDE 1 MG/ML
INJECTION INTRAMUSCULAR; INTRAVENOUS AS NEEDED
Status: COMPLETED | OUTPATIENT
Start: 2023-07-28 | End: 2023-07-28

## 2023-07-28 RX ORDER — MIDAZOLAM HYDROCHLORIDE 1 MG/ML
INJECTION INTRAMUSCULAR; INTRAVENOUS
Status: DISPENSED
Start: 2023-07-28 | End: 2023-07-28

## 2023-07-28 RX ORDER — SODIUM CHLORIDE 0.9 % (FLUSH) 0.9 %
10 SYRINGE (ML) INJECTION AS NEEDED
Status: DISCONTINUED | OUTPATIENT
Start: 2023-07-28 | End: 2023-07-29 | Stop reason: HOSPADM

## 2023-07-28 RX ORDER — FENTANYL CITRATE 50 UG/ML
INJECTION, SOLUTION INTRAMUSCULAR; INTRAVENOUS AS NEEDED
Status: COMPLETED | OUTPATIENT
Start: 2023-07-28 | End: 2023-07-28

## 2023-07-28 RX ADMIN — DESMOPRESSIN ACETATE 26 MCG: 4 SOLUTION INTRAVENOUS at 08:35

## 2023-07-28 RX ADMIN — FENTANYL CITRATE 50 MCG: 50 INJECTION, SOLUTION INTRAMUSCULAR; INTRAVENOUS at 08:54

## 2023-07-28 RX ADMIN — LIDOCAINE HYDROCHLORIDE 10 ML: 10 INJECTION, SOLUTION INFILTRATION; PERINEURAL at 09:25

## 2023-07-28 RX ADMIN — MIDAZOLAM HYDROCHLORIDE 1 MG: 1 INJECTION, SOLUTION INTRAMUSCULAR; INTRAVENOUS at 08:54

## 2023-07-28 NOTE — NURSING NOTE
Image guided left renal biopsy performed by Dr Rosen. 5 samples obtained and sent to the lab for testing. Patient was sedated for 25 minutes, and given 1 mg Versed and 50 mcg Fentanyl. Dsg to site per protocol. Patient tolerated well. Report called to tori Mercedes in tsering.

## 2023-07-28 NOTE — NURSING NOTE
Patients outpatient dialysis contacted regarding patients low H/H and that she was having a renal biopsy today. Dr Rosen would prefer them to hold heparin if possible tomorrow for her dialysis. Discussed with Gifty Mcelroy RN and she verbalized understanding.patient also made aware.

## 2023-07-31 ENCOUNTER — TELEPHONE (OUTPATIENT)
Dept: INFUSION THERAPY | Facility: HOSPITAL | Age: 68
End: 2023-07-31
Payer: MEDICARE

## 2023-08-01 LAB
LAB AP CASE REPORT: NORMAL
LAB AP CLINICAL INFORMATION: NORMAL
PATH REPORT.FINAL DX SPEC: NORMAL

## 2024-02-21 ENCOUNTER — TRANSCRIBE ORDERS (OUTPATIENT)
Dept: ADMINISTRATIVE | Facility: HOSPITAL | Age: 69
End: 2024-02-21
Payer: MEDICARE

## 2024-02-21 DIAGNOSIS — Z12.31 ENCOUNTER FOR SCREENING MAMMOGRAM FOR MALIGNANT NEOPLASM OF BREAST: Primary | ICD-10-CM

## 2024-05-06 ENCOUNTER — HOSPITAL ENCOUNTER (OUTPATIENT)
Dept: MAMMOGRAPHY | Facility: HOSPITAL | Age: 69
Discharge: HOME | End: 2024-05-06
Admitting: OBSTETRICS & GYNECOLOGY
Payer: MEDICARE

## 2024-05-06 DIAGNOSIS — Z12.31 ENCOUNTER FOR SCREENING MAMMOGRAM FOR MALIGNANT NEOPLASM OF BREAST: ICD-10-CM

## 2024-05-06 PROCEDURE — 77067 SCR MAMMO BI INCL CAD: CPT

## 2024-05-06 PROCEDURE — 77063 BREAST TOMOSYNTHESIS BI: CPT

## 2025-01-17 ENCOUNTER — TRANSCRIBE ORDERS (OUTPATIENT)
Dept: ADMINISTRATIVE | Facility: HOSPITAL | Age: 70
End: 2025-01-17
Payer: MEDICARE

## 2025-01-17 DIAGNOSIS — Z12.31 VISIT FOR SCREENING MAMMOGRAM: Primary | ICD-10-CM

## 2025-05-08 ENCOUNTER — HOSPITAL ENCOUNTER (OUTPATIENT)
Dept: MAMMOGRAPHY | Facility: HOSPITAL | Age: 70
Discharge: HOME | End: 2025-05-08
Admitting: OBSTETRICS & GYNECOLOGY
Payer: MEDICARE

## 2025-05-08 DIAGNOSIS — Z12.31 VISIT FOR SCREENING MAMMOGRAM: ICD-10-CM

## 2025-05-08 PROCEDURE — 77067 SCR MAMMO BI INCL CAD: CPT

## 2025-05-08 PROCEDURE — 77063 BREAST TOMOSYNTHESIS BI: CPT

## 2025-05-19 ENCOUNTER — OFFICE VISIT (OUTPATIENT)
Dept: NEUROLOGY | Facility: CLINIC | Age: 70
End: 2025-05-19
Payer: MEDICARE

## 2025-05-19 VITALS
SYSTOLIC BLOOD PRESSURE: 126 MMHG | BODY MASS INDEX: 35.51 KG/M2 | OXYGEN SATURATION: 97 % | DIASTOLIC BLOOD PRESSURE: 76 MMHG | HEART RATE: 65 BPM | HEIGHT: 62 IN | WEIGHT: 193 LBS

## 2025-05-19 DIAGNOSIS — R68.89 FORGETFULNESS: Primary | ICD-10-CM

## 2025-05-19 PROCEDURE — 1159F MED LIST DOCD IN RCRD: CPT | Performed by: PSYCHIATRY & NEUROLOGY

## 2025-05-19 PROCEDURE — 99205 OFFICE O/P NEW HI 60 MIN: CPT | Performed by: PSYCHIATRY & NEUROLOGY

## 2025-05-19 PROCEDURE — 1160F RVW MEDS BY RX/DR IN RCRD: CPT | Performed by: PSYCHIATRY & NEUROLOGY

## 2025-05-19 PROCEDURE — 3078F DIAST BP <80 MM HG: CPT | Performed by: PSYCHIATRY & NEUROLOGY

## 2025-05-19 PROCEDURE — 3074F SYST BP LT 130 MM HG: CPT | Performed by: PSYCHIATRY & NEUROLOGY

## 2025-05-19 RX ORDER — CETIRIZINE HYDROCHLORIDE 10 MG/1
10 TABLET ORAL DAILY
COMMUNITY

## 2025-05-19 RX ORDER — MEMANTINE HYDROCHLORIDE AND DONEPEZIL HYDROCHLORIDE 7; 10 MG/1; MG/1
CAPSULE ORAL DAILY
COMMUNITY
Start: 2025-04-16

## 2025-05-19 RX ORDER — ACYCLOVIR 800 MG/1
TABLET ORAL
COMMUNITY
Start: 2025-05-14

## 2025-05-19 RX ORDER — FUROSEMIDE 80 MG/1
80 TABLET ORAL DAILY PRN
COMMUNITY

## 2025-05-19 RX ORDER — TIRZEPATIDE 5 MG/.5ML
5 INJECTION, SOLUTION SUBCUTANEOUS
COMMUNITY
Start: 2025-02-20

## 2025-05-19 NOTE — PROGRESS NOTES
Chief Complaint   Patient presents with    Memory Loss       Patient ID: Karen Fragoso is a 70 y.o. female.    HPI:  I have had the pleasure of seeing your patient today.  As you may know she is a 70-year-old female here for the evaluation and treatment of memory change.  She says that she has had some issues that have been brought to her attention by her  and other family members.  She describes it as some forgetfulness of conversations.  She states that when she goes to the market typically she will have to write things down which is new for her.  She has been told that she repeats herself.  She also notes that she will walk into a room and forget why she was there.  She can be forgetful of names.  She may have some word finding trouble.  Things have not been progressively worsening.  However they have continued and are of significant concern to the patient and the patient's family.  She has not had any severe head trauma previously.  No resting tremor.  No changes of gait.  She is able to drive and does not get lost.  She has not experienced any issues related to alcoholism.  Recent MRI imaging of her brain performed in January of this year shows no acute process however did show periventricular small vessel changes.  She is not aware of any family history of dementia.  She does not leave the water running or stove on.  She is able to maintain her finances adequately.  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  He has not had any issues related to that process.    The following portions of the patient's history were reviewed and updated as appropriate: allergies, current medications, past family history, past medical history, past social history, past surgical history and problem list.    Review of Systems   Neurological:  Negative for dizziness, tremors, seizures, syncope, facial asymmetry, speech difficulty, weakness,  light-headedness, numbness and headaches.   Psychiatric/Behavioral:  Positive for confusion. Negative for agitation, behavioral problems, decreased concentration, dysphoric mood, hallucinations, self-injury, sleep disturbance and suicidal ideas. The patient is not nervous/anxious and is not hyperactive.       I have reviewed the review of systems above performed by my medical assistant.      Vitals:    05/19/25 1428   BP: 126/76   Pulse: 65   SpO2: 97%       Neurological Exam  Mental Status  Awake, alert and oriented to person, place and time. Recalls 3 of 3 objects immediately. At 3 minutes recalls 2 of 3 objects. Recalls 3 of 3 objects with prompting. Speech is normal. Language is fluent with no aphasia. Attention and concentration are normal. Fund of knowledge is appropriate for level of education. MMSE score: 29.    Cranial Nerves  CN I: Sense of smell is normal.  CN II: Visual acuity is normal.  CN III, IV, VI: Extraocular movements intact bilaterally. Pupils equal round and reactive to light bilaterally.  CN V: Facial sensation is normal.  CN VII: Full and symmetric facial movement.  CN XI: Shoulder shrug strength is normal.  CN XII: Tongue midline without atrophy or fasciculations.    Motor  Normal muscle bulk throughout. No fasciculations present. Normal muscle tone. No abnormal involuntary movements. No pronator drift.                                             Right                     Left  Rhomboids                            5                          5  Infraspinatus                          5                          5  Supraspinatus                       5                          5  Deltoid                                   5                          5   Biceps                                   5                          5  Brachioradialis                      5                          5   Triceps                                  5                          5   Pronator                                 5                          5   Supinator                              5                           5   Wrist flexor                            5                          5   Wrist extensor                       5                          5   Finger flexor                          5                          5   Finger extensor                     5                          5   Interossei                              5                          5   Abductor pollicis brevis         5                          5   Flexor pollicis brevis             5                          5   Opponens pollicis                 5                          5  Extensor digitorum               5                          5  Abductor digiti minimi           5                          5   Abdominal                            5                          5  Glutei                                    5                          5  Hip abductor                         5                          5  Hip adductor                         5                          5   Iliopsoas                               5                          5   Quadriceps                           5                          5   Hamstring                             5                          5   Gastrocnemius                     5                           5   Anterior tibialis                      5                          5   Posterior tibialis                    5                          5   Peroneal                               5                          5  Ankle dorsiflexor                   5                          5  Ankle plantar flexor              5                           5  Extensor hallucis longus      5                           5    Sensory  Sensation is intact to light touch, pinprick, vibration and proprioception in all four extremities.    Reflexes  Deep tendon reflexes are 2+ and symmetric in all four extremities.    Right pathological reflexes:  Jerry's absent.  Left pathological reflexes: Jerry's absent.    Coordination    Finger-to-nose, rapid alternating movements and heel-to-shin normal bilaterally without dysmetria.    Gait  Normal casual, toe, heel and tandem gait.       Physical Exam  Vitals reviewed.   Constitutional:       General: She is not in acute distress.     Appearance: She is well-developed.   HENT:      Head: Normocephalic and atraumatic.   Eyes:      Extraocular Movements: Extraocular movements intact.      Pupils: Pupils are equal, round, and reactive to light.   Cardiovascular:      Rate and Rhythm: Normal rate and regular rhythm.      Heart sounds: Normal heart sounds.   Pulmonary:      Effort: Pulmonary effort is normal. No respiratory distress.      Breath sounds: Normal breath sounds.   Abdominal:      General: Bowel sounds are normal. There is no distension.      Palpations: Abdomen is soft.      Tenderness: There is no abdominal tenderness.   Musculoskeletal:         General: No deformity.      Cervical back: Normal range of motion.   Skin:     General: Skin is warm.      Findings: No rash.   Neurological:      Coordination: Coordination is intact.      Deep Tendon Reflexes: Reflexes are normal and symmetric.   Psychiatric:         Speech: Speech normal.         Judgment: Judgment normal.         Procedures    Assessment/Plan: Despite her symptoms of forgetfulness I do not feel that she has dementia.  Could she have mild cognitive impairment?  We will refer her for neuropsych testing.  No need for follow-up MRI imaging.  She has had routine memory labs that appeared within normal limits.  Therefore we will see her back after her memory test to discuss further care.  A total of 60 minutes was spent face-to-face with the patient today.  Of that greater than 50% of this time was spent discussing signs and symptoms of forgetfulness, patient education, plan of care and prognosis.         Diagnoses and all orders for this  visit:    1. Forgetfulness (Primary)  -     Ambulatory Referral to Neuropsychology           Mark Mendiola II, MD

## 2025-05-20 ENCOUNTER — PATIENT ROUNDING (BHMG ONLY) (OUTPATIENT)
Dept: NEUROLOGY | Facility: CLINIC | Age: 70
End: 2025-05-20
Payer: MEDICARE